# Patient Record
Sex: MALE | Race: WHITE | NOT HISPANIC OR LATINO | URBAN - METROPOLITAN AREA
[De-identification: names, ages, dates, MRNs, and addresses within clinical notes are randomized per-mention and may not be internally consistent; named-entity substitution may affect disease eponyms.]

---

## 2018-07-03 ENCOUNTER — INPATIENT (INPATIENT)
Facility: HOSPITAL | Age: 43
LOS: 1 days | Discharge: HOME | DRG: 305 | End: 2018-07-05
Attending: INTERNAL MEDICINE | Admitting: INTERNAL MEDICINE
Payer: MEDICAID

## 2018-07-03 VITALS
HEIGHT: 68 IN | TEMPERATURE: 98 F | OXYGEN SATURATION: 98 % | RESPIRATION RATE: 16 BRPM | WEIGHT: 165.35 LBS | SYSTOLIC BLOOD PRESSURE: 186 MMHG | DIASTOLIC BLOOD PRESSURE: 127 MMHG | HEART RATE: 70 BPM

## 2018-07-03 DIAGNOSIS — E78.5 HYPERLIPIDEMIA, UNSPECIFIED: ICD-10-CM

## 2018-07-03 DIAGNOSIS — Z29.9 ENCOUNTER FOR PROPHYLACTIC MEASURES, UNSPECIFIED: ICD-10-CM

## 2018-07-03 DIAGNOSIS — R42 DIZZINESS AND GIDDINESS: ICD-10-CM

## 2018-07-03 DIAGNOSIS — I16.1 HYPERTENSIVE EMERGENCY: ICD-10-CM

## 2018-07-03 LAB
ALBUMIN SERPL ELPH-MCNC: 4.3 G/DL — SIGNIFICANT CHANGE UP (ref 3.5–5)
ALP SERPL-CCNC: 62 U/L — SIGNIFICANT CHANGE UP (ref 40–120)
ALT FLD-CCNC: 39 U/L DA — SIGNIFICANT CHANGE UP (ref 10–60)
ANION GAP SERPL CALC-SCNC: 6 MMOL/L — SIGNIFICANT CHANGE UP (ref 5–17)
APPEARANCE UR: CLEAR — SIGNIFICANT CHANGE UP
APPEARANCE UR: CLEAR — SIGNIFICANT CHANGE UP
AST SERPL-CCNC: 32 U/L — SIGNIFICANT CHANGE UP (ref 10–40)
BACTERIA # UR AUTO: NEGATIVE /HPF — SIGNIFICANT CHANGE UP
BASOPHILS # BLD AUTO: 0.1 K/UL — SIGNIFICANT CHANGE UP (ref 0–0.2)
BASOPHILS NFR BLD AUTO: 1.1 % — SIGNIFICANT CHANGE UP (ref 0–2)
BILIRUB SERPL-MCNC: 0.8 MG/DL — SIGNIFICANT CHANGE UP (ref 0.2–1.2)
BILIRUB UR-MCNC: NEGATIVE — SIGNIFICANT CHANGE UP
BILIRUB UR-MCNC: NEGATIVE — SIGNIFICANT CHANGE UP
BUN SERPL-MCNC: 10 MG/DL — SIGNIFICANT CHANGE UP (ref 7–18)
CALCIUM SERPL-MCNC: 9.5 MG/DL — SIGNIFICANT CHANGE UP (ref 8.4–10.5)
CHLORIDE SERPL-SCNC: 103 MMOL/L — SIGNIFICANT CHANGE UP (ref 96–108)
CK MB BLD-MCNC: <0.7 % — SIGNIFICANT CHANGE UP (ref 0–3.5)
CK MB BLD-MCNC: <0.8 % — SIGNIFICANT CHANGE UP (ref 0–3.5)
CK MB CFR SERPL CALC: <1 NG/ML — SIGNIFICANT CHANGE UP (ref 0–3.6)
CK SERPL-CCNC: 121 U/L — SIGNIFICANT CHANGE UP (ref 35–232)
CK SERPL-CCNC: 141 U/L — SIGNIFICANT CHANGE UP (ref 35–232)
CO2 SERPL-SCNC: 28 MMOL/L — SIGNIFICANT CHANGE UP (ref 22–31)
COLOR SPEC: YELLOW — SIGNIFICANT CHANGE UP
COLOR SPEC: YELLOW — SIGNIFICANT CHANGE UP
CREAT SERPL-MCNC: 0.85 MG/DL — SIGNIFICANT CHANGE UP (ref 0.5–1.3)
DIFF PNL FLD: NEGATIVE — SIGNIFICANT CHANGE UP
DIFF PNL FLD: NEGATIVE — SIGNIFICANT CHANGE UP
EOSINOPHIL # BLD AUTO: 0.1 K/UL — SIGNIFICANT CHANGE UP (ref 0–0.5)
EOSINOPHIL NFR BLD AUTO: 1 % — SIGNIFICANT CHANGE UP (ref 0–6)
EPI CELLS # UR: NEGATIVE /HPF — SIGNIFICANT CHANGE UP
GLUCOSE SERPL-MCNC: 102 MG/DL — HIGH (ref 70–99)
GLUCOSE UR QL: NEGATIVE — SIGNIFICANT CHANGE UP
GLUCOSE UR QL: NEGATIVE — SIGNIFICANT CHANGE UP
HCT VFR BLD CALC: 44 % — SIGNIFICANT CHANGE UP (ref 39–50)
HGB BLD-MCNC: 14.6 G/DL — SIGNIFICANT CHANGE UP (ref 13–17)
KETONES UR-MCNC: NEGATIVE — SIGNIFICANT CHANGE UP
KETONES UR-MCNC: NEGATIVE — SIGNIFICANT CHANGE UP
LEUKOCYTE ESTERASE UR-ACNC: NEGATIVE — SIGNIFICANT CHANGE UP
LEUKOCYTE ESTERASE UR-ACNC: NEGATIVE — SIGNIFICANT CHANGE UP
LYMPHOCYTES # BLD AUTO: 1.9 K/UL — SIGNIFICANT CHANGE UP (ref 1–3.3)
LYMPHOCYTES # BLD AUTO: 25.9 % — SIGNIFICANT CHANGE UP (ref 13–44)
MAGNESIUM SERPL-MCNC: 2.4 MG/DL — SIGNIFICANT CHANGE UP (ref 1.6–2.6)
MCHC RBC-ENTMCNC: 32.1 PG — SIGNIFICANT CHANGE UP (ref 27–34)
MCHC RBC-ENTMCNC: 33.3 GM/DL — SIGNIFICANT CHANGE UP (ref 32–36)
MCV RBC AUTO: 96.3 FL — SIGNIFICANT CHANGE UP (ref 80–100)
MONOCYTES # BLD AUTO: 0.6 K/UL — SIGNIFICANT CHANGE UP (ref 0–0.9)
MONOCYTES NFR BLD AUTO: 8 % — SIGNIFICANT CHANGE UP (ref 2–14)
NEUTROPHILS # BLD AUTO: 4.7 K/UL — SIGNIFICANT CHANGE UP (ref 1.8–7.4)
NEUTROPHILS NFR BLD AUTO: 64.1 % — SIGNIFICANT CHANGE UP (ref 43–77)
NITRITE UR-MCNC: NEGATIVE — SIGNIFICANT CHANGE UP
NITRITE UR-MCNC: NEGATIVE — SIGNIFICANT CHANGE UP
PCP SPEC-MCNC: SIGNIFICANT CHANGE UP
PH UR: 7 — SIGNIFICANT CHANGE UP (ref 5–8)
PH UR: 7 — SIGNIFICANT CHANGE UP (ref 5–8)
PLATELET # BLD AUTO: 157 K/UL — SIGNIFICANT CHANGE UP (ref 150–400)
POTASSIUM SERPL-MCNC: 4.3 MMOL/L — SIGNIFICANT CHANGE UP (ref 3.5–5.3)
POTASSIUM SERPL-SCNC: 4.3 MMOL/L — SIGNIFICANT CHANGE UP (ref 3.5–5.3)
PROT SERPL-MCNC: 8.2 G/DL — SIGNIFICANT CHANGE UP (ref 6–8.3)
PROT UR-MCNC: NEGATIVE — SIGNIFICANT CHANGE UP
PROT UR-MCNC: NEGATIVE — SIGNIFICANT CHANGE UP
RBC # BLD: 4.57 M/UL — SIGNIFICANT CHANGE UP (ref 4.2–5.8)
RBC # FLD: 10.7 % — SIGNIFICANT CHANGE UP (ref 10.3–14.5)
RBC CASTS # UR COMP ASSIST: SIGNIFICANT CHANGE UP /HPF (ref 0–2)
SODIUM SERPL-SCNC: 137 MMOL/L — SIGNIFICANT CHANGE UP (ref 135–145)
SP GR SPEC: 1 — LOW (ref 1.01–1.02)
SP GR SPEC: 1.01 — SIGNIFICANT CHANGE UP (ref 1.01–1.02)
TROPONIN I SERPL-MCNC: 0.06 NG/ML — HIGH (ref 0–0.04)
TROPONIN I SERPL-MCNC: 0.07 NG/ML — HIGH (ref 0–0.04)
TROPONIN I SERPL-MCNC: 0.08 NG/ML — HIGH (ref 0–0.04)
UROBILINOGEN FLD QL: NEGATIVE — SIGNIFICANT CHANGE UP
UROBILINOGEN FLD QL: NEGATIVE — SIGNIFICANT CHANGE UP
WBC # BLD: 7.3 K/UL — SIGNIFICANT CHANGE UP (ref 3.8–10.5)
WBC # FLD AUTO: 7.3 K/UL — SIGNIFICANT CHANGE UP (ref 3.8–10.5)
WBC UR QL: SIGNIFICANT CHANGE UP /HPF (ref 0–5)

## 2018-07-03 PROCEDURE — 70450 CT HEAD/BRAIN W/O DYE: CPT | Mod: 26

## 2018-07-03 PROCEDURE — 99223 1ST HOSP IP/OBS HIGH 75: CPT | Mod: GC

## 2018-07-03 PROCEDURE — 99285 EMERGENCY DEPT VISIT HI MDM: CPT

## 2018-07-03 RX ORDER — MORPHINE SULFATE 50 MG/1
4 CAPSULE, EXTENDED RELEASE ORAL ONCE
Qty: 0 | Refills: 0 | Status: DISCONTINUED | OUTPATIENT
Start: 2018-07-03 | End: 2018-07-03

## 2018-07-03 RX ORDER — TICAGRELOR 90 MG/1
180 TABLET ORAL ONCE
Qty: 0 | Refills: 0 | Status: COMPLETED | OUTPATIENT
Start: 2018-07-03 | End: 2018-07-03

## 2018-07-03 RX ORDER — LABETALOL HCL 100 MG
10 TABLET ORAL ONCE
Qty: 0 | Refills: 0 | Status: COMPLETED | OUTPATIENT
Start: 2018-07-03 | End: 2018-07-03

## 2018-07-03 RX ORDER — ENOXAPARIN SODIUM 100 MG/ML
70 INJECTION SUBCUTANEOUS ONCE
Qty: 0 | Refills: 0 | Status: COMPLETED | OUTPATIENT
Start: 2018-07-03 | End: 2018-07-03

## 2018-07-03 RX ORDER — NIFEDIPINE 30 MG
90 TABLET, EXTENDED RELEASE 24 HR ORAL ONCE
Qty: 0 | Refills: 0 | Status: DISCONTINUED | OUTPATIENT
Start: 2018-07-03 | End: 2018-07-03

## 2018-07-03 RX ORDER — SODIUM CHLORIDE 9 MG/ML
2000 INJECTION INTRAMUSCULAR; INTRAVENOUS; SUBCUTANEOUS ONCE
Qty: 0 | Refills: 0 | Status: COMPLETED | OUTPATIENT
Start: 2018-07-03 | End: 2018-07-03

## 2018-07-03 RX ORDER — ATORVASTATIN CALCIUM 80 MG/1
80 TABLET, FILM COATED ORAL ONCE
Qty: 0 | Refills: 0 | Status: COMPLETED | OUTPATIENT
Start: 2018-07-03 | End: 2018-07-03

## 2018-07-03 RX ORDER — AMLODIPINE BESYLATE 2.5 MG/1
10 TABLET ORAL DAILY
Qty: 0 | Refills: 0 | Status: DISCONTINUED | OUTPATIENT
Start: 2018-07-03 | End: 2018-07-03

## 2018-07-03 RX ORDER — NICOTINE POLACRILEX 2 MG
1 GUM BUCCAL DAILY
Qty: 0 | Refills: 0 | Status: DISCONTINUED | OUTPATIENT
Start: 2018-07-03 | End: 2018-07-05

## 2018-07-03 RX ORDER — METOPROLOL TARTRATE 50 MG
12.5 TABLET ORAL
Qty: 0 | Refills: 0 | Status: DISCONTINUED | OUTPATIENT
Start: 2018-07-03 | End: 2018-07-05

## 2018-07-03 RX ORDER — ASPIRIN/CALCIUM CARB/MAGNESIUM 324 MG
81 TABLET ORAL DAILY
Qty: 0 | Refills: 0 | Status: DISCONTINUED | OUTPATIENT
Start: 2018-07-03 | End: 2018-07-05

## 2018-07-03 RX ORDER — OMEGA-3 ACID ETHYL ESTERS 1 G
1 CAPSULE ORAL
Qty: 0 | Refills: 0 | COMMUNITY

## 2018-07-03 RX ORDER — ATORVASTATIN CALCIUM 80 MG/1
40 TABLET, FILM COATED ORAL AT BEDTIME
Qty: 0 | Refills: 0 | Status: DISCONTINUED | OUTPATIENT
Start: 2018-07-04 | End: 2018-07-05

## 2018-07-03 RX ORDER — HYDRALAZINE HCL 50 MG
50 TABLET ORAL ONCE
Qty: 0 | Refills: 0 | Status: COMPLETED | OUTPATIENT
Start: 2018-07-03 | End: 2018-07-03

## 2018-07-03 RX ORDER — NIFEDIPINE 30 MG
60 TABLET, EXTENDED RELEASE 24 HR ORAL ONCE
Qty: 0 | Refills: 0 | Status: COMPLETED | OUTPATIENT
Start: 2018-07-03 | End: 2018-07-03

## 2018-07-03 RX ORDER — ASPIRIN/CALCIUM CARB/MAGNESIUM 324 MG
325 TABLET ORAL ONCE
Qty: 0 | Refills: 0 | Status: COMPLETED | OUTPATIENT
Start: 2018-07-03 | End: 2018-07-03

## 2018-07-03 RX ORDER — LISINOPRIL 2.5 MG/1
10 TABLET ORAL DAILY
Qty: 0 | Refills: 0 | Status: DISCONTINUED | OUTPATIENT
Start: 2018-07-03 | End: 2018-07-05

## 2018-07-03 RX ADMIN — ENOXAPARIN SODIUM 70 MILLIGRAM(S): 100 INJECTION SUBCUTANEOUS at 13:48

## 2018-07-03 RX ADMIN — Medication 325 MILLIGRAM(S): at 13:42

## 2018-07-03 RX ADMIN — Medication 1 PATCH: at 23:28

## 2018-07-03 RX ADMIN — MORPHINE SULFATE 4 MILLIGRAM(S): 50 CAPSULE, EXTENDED RELEASE ORAL at 14:48

## 2018-07-03 RX ADMIN — Medication 60 MILLIGRAM(S): at 11:16

## 2018-07-03 RX ADMIN — ATORVASTATIN CALCIUM 80 MILLIGRAM(S): 80 TABLET, FILM COATED ORAL at 13:44

## 2018-07-03 RX ADMIN — TICAGRELOR 180 MILLIGRAM(S): 90 TABLET ORAL at 13:43

## 2018-07-03 RX ADMIN — Medication 10 MILLIGRAM(S): at 14:24

## 2018-07-03 RX ADMIN — Medication 50 MILLIGRAM(S): at 15:37

## 2018-07-03 RX ADMIN — SODIUM CHLORIDE 2000 MILLILITER(S): 9 INJECTION INTRAMUSCULAR; INTRAVENOUS; SUBCUTANEOUS at 11:06

## 2018-07-03 RX ADMIN — MORPHINE SULFATE 4 MILLIGRAM(S): 50 CAPSULE, EXTENDED RELEASE ORAL at 16:28

## 2018-07-03 NOTE — H&P ADULT - FAMILY HISTORY
Father  Still living? Yes, Estimated age: Age Unknown  Family history of hypertension in father, Age at diagnosis: Age Unknown     Mother  Still living? Unknown  Family history of diabetes mellitus in mother, Age at diagnosis: Age Unknown

## 2018-07-03 NOTE — ED PROVIDER NOTE - OBJECTIVE STATEMENT
Pertinent PMH/PSH/FHx/SHx and Review of Systems contained within:  43M hx high cholesterol on fish on pw 2 weeks dizziness and left sided achiness. patient notes the symptom of dizziness is the sensation that he is spinning around. he denies the room spins and denies he is lightheaded. no cp, sob, nausea, vomiting, ha, vision change, trauma. patient has not falllen. he notes he had a similar episode 5 years ago and was told cholesterol was the cause at that time  he comes in after 2 weeks because he is tired of the symptoms and they would not resolve on their own, but they are no worse today than before  Fh and Sh not otherwise contributory  ROS otherwise negative

## 2018-07-03 NOTE — H&P ADULT - ATTENDING COMMENTS
Patient seen/evaluated at bedside 7/3/2018. I agree with the resident H&P note/outlined plan of care. My independent findings and conclusions are documented. Patient seen/evaluated at bedside 7/3/2018. I agree with the resident H&P note/outlined plan of care. My independent findings and conclusions are documented.    42 y/o m admitted with hypertension with systolic bp to 202, diastolic BP in the 120s. Denies chest pain, sob, visual changes, nausea/vomiting, focal weakness.  In addition to above: Patient denies chest pain  Patient with left upper quadrant abdominal pain--> worse with sitting upright. No nausea/vomiting, dysuria, hematuria. + chronic neck pain, left arm pain x 5 years    Of note, patient was given Brillinta and Lovenox by ED staff. Troponin 0.058    AAOx3 NAD  neck supple  S1S2 RRR  CTAb/l no accessory muscle use  soft, NT, ND, + BS, no guarding/rebound  no flank tenderness  mild left upper quadrant pain  no LE edema/cyanosis/clubbing  CN II-XII intact  strength 5/5 upper/lower extremities  sensation intact  reflexes 2+ patellar      1. hypertensive emergency  2. elevated troponin/demand ischemia  3. lightheadedness  4. left arm pain  5. left upper quadrant pain  6. nasopharyngeal fullness  7. continuous tobacco dependence    no signs of cardiac/neurologic decompensation at this time. EKG w/ TWI likely secondary to   goal SBP 170s at this time. Urine toxicology positive for morphine administered in ED (no cocaine/ amphetamines/PCP)  -cycle cardiac biomarkers  -can start oral regimen, lisinopril  -continue to trend troponin, defer  therapeutitic AC/antiplatelet outside of ASA at this time  will have second radiology review of CT head imaging: ? "nasopharyngeal fullness"--> may need appt arranged with ENT if asymptomatic  -TTE  -monitor on telemetry  -lipid panel, HgbA1c, Patient seen/evaluated at bedside 7/3/2018. I agree with the resident H&P note/outlined plan of care. My independent findings and conclusions are documented.    44 y/o m admitted with hypertension with systolic bp to 202, diastolic BP in the 120s. Denies chest pain, sob, visual changes, nausea/vomiting, focal weakness.  In addition to above: Patient denies chest pain  Patient with left upper quadrant abdominal pain--> worse with sitting upright. No nausea/vomiting, dysuria, hematuria. + chronic neck pain, left arm pain x 5 years    Of note, patient was given Brillinta and Lovenox by ED staff. Troponin 0.058    AAOx3 NAD  neck supple  S1S2 RRR  CTAb/l no accessory muscle use  soft, NT, ND, + BS, no guarding/rebound  no flank tenderness  mild left upper quadrant pain  no LE edema/cyanosis/clubbing  CN II-XII intact  strength 5/5 upper/lower extremities  sensation intact  reflexes 2+ patellar      1. hypertensive emergency  2. elevated troponin/demand ischemia  3. lightheadedness  4. left arm pain  5. left upper quadrant pain  6. nasopharyngeal fullness  7. continuous tobacco dependence    no signs of cardiac/neurologic decompensation at this time. EKG w/ TWI likely secondary to   goal SBP 170s at this time. Urine toxicology positive for morphine administered in ED (no cocaine/ amphetamines/PCP)  -cycle cardiac biomarkers  -can start oral regimen, lisinopril  -continue to trend troponin, defer  therapeutitic AC/antiplatelet outside of ASA at this time  will have second radiology review of CT head imaging: ? "nasopharyngeal fullness"--> may need appt arranged with ENT if asymptomatic  -TTE  -monitor on telemetry  -lipid panel, HgbA1c,  -discuss w/ cardiology  -check CT abd/pelvis  -check CT cervical neck  -consider trial of neurontin--> potential radicular symptoms, will re-assess  -tobacco use cessation

## 2018-07-03 NOTE — H&P ADULT - NEGATIVE CARDIOVASCULAR SYMPTOMS
no claudication/no peripheral edema/no palpitations/no dyspnea on exertion/no orthopnea/no chest pain

## 2018-07-03 NOTE — H&P ADULT - NEUROLOGICAL DETAILS
sensation intact/alert and oriented x 3/responds to verbal commands/responds to pain/normal strength

## 2018-07-03 NOTE — ED PROVIDER NOTE - PHYSICAL EXAMINATION
Gen: Alert, NAD  Head: NC, AT   Eyes: PERRL, EOMI, normal lids/conjunctiva  ENT: normal hearing, patent oropharynx without erythema/exudate, uvula midline  Neck: supple, no tenderness, Trachea midline  Pulm: Bilateral BS, normal resp effort, no wheeze/stridor/retractions  CV: RRR, no M/R/G, 2+ radial and dp pulses bl, no edema  Abd: soft, NT/ND, +BS, no hepatosplenomegaly  Mskel: extremities x4 with normal ROM and no joint effusions. no ctl spine ttp.   Skin: no rash, no bruising   Neuro: AAOx3, no sensory/motor deficits, CN 2-12 intact, gait stable

## 2018-07-03 NOTE — H&P ADULT - HISTORY OF PRESENT ILLNESS
43M PMHx HLD, who presented to ED c/o lightheadedness and frontal headache x1 week, along with left arm pain and LUQ pain that started last night. Patient refers having blood pressure checked by his sister, which revealed SBP: 185. Symptoms were not resolving and patient decided to come to ED. As per patient, this is the first time he has high blood pressure. Denies N/V, visual changes, chest pain, palpitations, shortness of breath, paresthesias or previous similar episodes. Patient is a current tobacco smoker, 1.5 PPD for >20yrs, current marijuana user and social alcohol drinker. Last PCP follow up visit was 2-3 yrs ago. Denies recreational drug use.    ED course:  BP: 202/103 mmHg, HR: 58 bpm, RR: 16 rpm, SaO2: 98% RA, T: 98F; s/p Labetalol 10mg IVP  ECG: NSR VR @69bpm, no ST changes, TWI lead III  T1: 0.058  s/p full dose Lovenox,  mg, Lipitor 80 mg HS, Brilinta 180 mg prophylactically given by ED physician

## 2018-07-03 NOTE — H&P ADULT - RS GEN PE MLT RESP DETAILS PC
clear to auscultation bilaterally/no chest wall tenderness/good air movement/no wheezes/no rales/no rhonchi

## 2018-07-03 NOTE — ED PROVIDER NOTE - MEDICAL DECISION MAKING DETAILS
patient pw dizziness. suspect dehydration vs symptomatic htn. will give fluids and also start on an anti-htn. will check labs and ct scan. this would be an atypical presenation for acs and given symptoms have not changed in 2 weeks, will order a solitary trop. I read ekg as nsr rate 69 with LVH; no st elevation or depression, no pr or qtc prolongation, normal axis. patient pw dizziness. suspect dehydration vs symptomatic htn. will give fluids and also start on an anti-htn. will check labs and ct scan. this would be an atypical presentation for acs and given symptoms have not changed in 2 weeks, will order a solitary trop. I read ekg as nsr rate 69 with LVH; no st elevation or depression, no pr or qtc prolongation, normal axis.

## 2018-07-03 NOTE — H&P ADULT - PROBLEM SELECTOR PLAN 3
IMPROVE VTE Individual Risk Assessment          RISK                                                          Points  [  ] Previous VTE                                                3  [  ] Thrombophilia                                             2  [  ] Lower limb paralysis                                   2        (unable to hold up >15 seconds)    [  ] Current Cancer                                             2         (within 6 months)  [  ] Immobilization > 24 hrs                              1  [  ] ICU/CCU stay > 24 hours                             1  [  ] Age > 60                                                         1    IMPROVE VTE Score: 0  no need for DVT chemoppx

## 2018-07-03 NOTE — H&P ADULT - PROBLEM SELECTOR PLAN 1
Patient p/w lightheadedness and frontal headache x1 wk. BP: 203/103 mmHg  ECG: NSR VR@69 bpm, LVH TWI lead III, T1: 0.058, most likely due to demand  Will trend CE, f/up T2@5pm, T3@11pm  c/w ASA 81 mg + Lipitor 40 mg HS  Will hold off BB until UTox results  Goal  during first 24 hrs  c/w Norvasc 10 mg QD  Lopressor 2.5 mg IV q6hrs PRN SBP>175  Telemonitoring  Neurochecks q4hrs  CT head negative for acute events  f/up TTE Patient p/w lightheadedness and frontal headache x1 wk. BP: 203/103 mmHg  ECG: NSR VR@69 bpm, LVH TWI lead III, T1: 0.058, most likely due to demand  Will trend CE, f/up T2@5pm, T3@11pm  c/w ASA 81 mg + Lipitor 40 mg HS  Will hold off BB until UTox results  Goal  during first 24 hrs  c/w Norvasc 10 mg QD  Lopressor 2.5 mg IV q6hrs PRN SBP>175  Telemonitoring  CT head negative for acute events; no focal neurological findings on exam  Neurochecks q4hrs  f/up TTE Patient p/w lightheadedness and frontal headache x1 wk. BP: 203/103 mmHg  ECG: NSR VR@69 bpm, LVH TWI lead III, T1: 0.058, most likely due to demand  Will trend CE, f/up T2@5pm, T3@11pm  c/w ASA 81 mg + Lipitor 40 mg HS  Will hold off BB until UTox results  Goal  during first 24 hrs  c/w Lisinopril 10 mg QD for now, monitor BP and adjust as needed  Lopressor 2.5 mg IV q6hrs PRN SBP>175  Telemonitoring  CT head negative for acute events; no focal neurological findings on exam  Neurochecks q4hrs  f/up TTE

## 2018-07-03 NOTE — H&P ADULT - PROBLEM SELECTOR PLAN 2
Patient having history of high cholesterol, not on medications as home. Refers only taking fish oil supplements.  c/w Lipitor 40 mg HS  f/up Lipid panel

## 2018-07-03 NOTE — H&P ADULT - NEGATIVE NEUROLOGICAL SYMPTOMS
no tremors/no paresthesias/no confusion/no loss of consciousness/no syncope/no vertigo/no loss of sensation/no difficulty walking/no weakness

## 2018-07-03 NOTE — H&P ADULT - ASSESSMENT
43M PMHx HLD, who presented to ED c/o lightheadedness and frontal headache x1 week, along with left arm pain and LUQ pain that started last night.

## 2018-07-04 DIAGNOSIS — F17.200 NICOTINE DEPENDENCE, UNSPECIFIED, UNCOMPLICATED: ICD-10-CM

## 2018-07-04 DIAGNOSIS — M79.602 PAIN IN LEFT ARM: ICD-10-CM

## 2018-07-04 LAB
24R-OH-CALCIDIOL SERPL-MCNC: 22.9 NG/ML — LOW (ref 30–80)
ALBUMIN SERPL ELPH-MCNC: 3.8 G/DL — SIGNIFICANT CHANGE UP (ref 3.5–5)
ALP SERPL-CCNC: 55 U/L — SIGNIFICANT CHANGE UP (ref 40–120)
ALT FLD-CCNC: 36 U/L DA — SIGNIFICANT CHANGE UP (ref 10–60)
ANION GAP SERPL CALC-SCNC: 10 MMOL/L — SIGNIFICANT CHANGE UP (ref 5–17)
AST SERPL-CCNC: 23 U/L — SIGNIFICANT CHANGE UP (ref 10–40)
BASOPHILS # BLD AUTO: 0.1 K/UL — SIGNIFICANT CHANGE UP (ref 0–0.2)
BASOPHILS NFR BLD AUTO: 1.1 % — SIGNIFICANT CHANGE UP (ref 0–2)
BILIRUB SERPL-MCNC: 0.8 MG/DL — SIGNIFICANT CHANGE UP (ref 0.2–1.2)
BUN SERPL-MCNC: 8 MG/DL — SIGNIFICANT CHANGE UP (ref 7–18)
CALCIUM SERPL-MCNC: 9.2 MG/DL — SIGNIFICANT CHANGE UP (ref 8.4–10.5)
CHLORIDE SERPL-SCNC: 102 MMOL/L — SIGNIFICANT CHANGE UP (ref 96–108)
CHOLEST SERPL-MCNC: 166 MG/DL — SIGNIFICANT CHANGE UP (ref 10–199)
CK MB BLD-MCNC: <1 % — SIGNIFICANT CHANGE UP (ref 0–3.5)
CK MB CFR SERPL CALC: <1 NG/ML — SIGNIFICANT CHANGE UP (ref 0–3.6)
CK SERPL-CCNC: 97 U/L — SIGNIFICANT CHANGE UP (ref 35–232)
CO2 SERPL-SCNC: 25 MMOL/L — SIGNIFICANT CHANGE UP (ref 22–31)
CREAT SERPL-MCNC: 0.78 MG/DL — SIGNIFICANT CHANGE UP (ref 0.5–1.3)
D DIMER BLD IA.RAPID-MCNC: <150 NG/ML DDU — SIGNIFICANT CHANGE UP
EOSINOPHIL # BLD AUTO: 0.1 K/UL — SIGNIFICANT CHANGE UP (ref 0–0.5)
EOSINOPHIL NFR BLD AUTO: 1.2 % — SIGNIFICANT CHANGE UP (ref 0–6)
GLUCOSE SERPL-MCNC: 97 MG/DL — SIGNIFICANT CHANGE UP (ref 70–99)
HBA1C BLD-MCNC: 4.8 % — SIGNIFICANT CHANGE UP (ref 4–5.6)
HCT VFR BLD CALC: 43.6 % — SIGNIFICANT CHANGE UP (ref 39–50)
HDLC SERPL-MCNC: 42 MG/DL — SIGNIFICANT CHANGE UP (ref 40–125)
HGB BLD-MCNC: 15 G/DL — SIGNIFICANT CHANGE UP (ref 13–17)
LIPID PNL WITH DIRECT LDL SERPL: 96 MG/DL — SIGNIFICANT CHANGE UP
LYMPHOCYTES # BLD AUTO: 2.5 K/UL — SIGNIFICANT CHANGE UP (ref 1–3.3)
LYMPHOCYTES # BLD AUTO: 32.6 % — SIGNIFICANT CHANGE UP (ref 13–44)
MAGNESIUM SERPL-MCNC: 2.2 MG/DL — SIGNIFICANT CHANGE UP (ref 1.6–2.6)
MCHC RBC-ENTMCNC: 33.1 PG — SIGNIFICANT CHANGE UP (ref 27–34)
MCHC RBC-ENTMCNC: 34.3 GM/DL — SIGNIFICANT CHANGE UP (ref 32–36)
MCV RBC AUTO: 96.4 FL — SIGNIFICANT CHANGE UP (ref 80–100)
MONOCYTES # BLD AUTO: 0.7 K/UL — SIGNIFICANT CHANGE UP (ref 0–0.9)
MONOCYTES NFR BLD AUTO: 9.7 % — SIGNIFICANT CHANGE UP (ref 2–14)
NEUTROPHILS # BLD AUTO: 4.2 K/UL — SIGNIFICANT CHANGE UP (ref 1.8–7.4)
NEUTROPHILS NFR BLD AUTO: 55.4 % — SIGNIFICANT CHANGE UP (ref 43–77)
PHOSPHATE SERPL-MCNC: 4.2 MG/DL — SIGNIFICANT CHANGE UP (ref 2.5–4.5)
PLATELET # BLD AUTO: 145 K/UL — LOW (ref 150–400)
POTASSIUM SERPL-MCNC: 3.6 MMOL/L — SIGNIFICANT CHANGE UP (ref 3.5–5.3)
POTASSIUM SERPL-SCNC: 3.6 MMOL/L — SIGNIFICANT CHANGE UP (ref 3.5–5.3)
PROT SERPL-MCNC: 7.7 G/DL — SIGNIFICANT CHANGE UP (ref 6–8.3)
RBC # BLD: 4.52 M/UL — SIGNIFICANT CHANGE UP (ref 4.2–5.8)
RBC # FLD: 10.6 % — SIGNIFICANT CHANGE UP (ref 10.3–14.5)
SODIUM SERPL-SCNC: 137 MMOL/L — SIGNIFICANT CHANGE UP (ref 135–145)
TOTAL CHOLESTEROL/HDL RATIO MEASUREMENT: 4 RATIO — SIGNIFICANT CHANGE UP (ref 3.4–9.6)
TRIGL SERPL-MCNC: 140 MG/DL — SIGNIFICANT CHANGE UP (ref 10–149)
TROPONIN I SERPL-MCNC: 0.07 NG/ML — HIGH (ref 0–0.04)
TSH SERPL-MCNC: 1.2 UU/ML — SIGNIFICANT CHANGE UP (ref 0.34–4.82)
VIT B12 SERPL-MCNC: 549 PG/ML — SIGNIFICANT CHANGE UP (ref 232–1245)
WBC # BLD: 7.6 K/UL — SIGNIFICANT CHANGE UP (ref 3.8–10.5)
WBC # FLD AUTO: 7.6 K/UL — SIGNIFICANT CHANGE UP (ref 3.8–10.5)

## 2018-07-04 PROCEDURE — 74174 CTA ABD&PLVS W/CONTRAST: CPT | Mod: 26

## 2018-07-04 PROCEDURE — 93306 TTE W/DOPPLER COMPLETE: CPT | Mod: 26

## 2018-07-04 PROCEDURE — 99233 SBSQ HOSP IP/OBS HIGH 50: CPT | Mod: GC

## 2018-07-04 PROCEDURE — 93010 ELECTROCARDIOGRAM REPORT: CPT

## 2018-07-04 PROCEDURE — 72125 CT NECK SPINE W/O DYE: CPT | Mod: 26

## 2018-07-04 RX ORDER — ACETAMINOPHEN 500 MG
650 TABLET ORAL EVERY 6 HOURS
Qty: 0 | Refills: 0 | Status: DISCONTINUED | OUTPATIENT
Start: 2018-07-04 | End: 2018-07-05

## 2018-07-04 RX ORDER — SODIUM CHLORIDE 9 MG/ML
1000 INJECTION INTRAMUSCULAR; INTRAVENOUS; SUBCUTANEOUS
Qty: 0 | Refills: 0 | Status: DISCONTINUED | OUTPATIENT
Start: 2018-07-04 | End: 2018-07-05

## 2018-07-04 RX ADMIN — Medication 1 PATCH: at 12:19

## 2018-07-04 RX ADMIN — Medication 650 MILLIGRAM(S): at 08:44

## 2018-07-04 RX ADMIN — Medication 12.5 MILLIGRAM(S): at 05:26

## 2018-07-04 RX ADMIN — LISINOPRIL 10 MILLIGRAM(S): 2.5 TABLET ORAL at 05:26

## 2018-07-04 RX ADMIN — Medication 650 MILLIGRAM(S): at 09:45

## 2018-07-04 RX ADMIN — ATORVASTATIN CALCIUM 40 MILLIGRAM(S): 80 TABLET, FILM COATED ORAL at 22:26

## 2018-07-04 RX ADMIN — Medication 81 MILLIGRAM(S): at 12:19

## 2018-07-04 RX ADMIN — Medication 650 MILLIGRAM(S): at 17:20

## 2018-07-04 RX ADMIN — Medication 650 MILLIGRAM(S): at 02:41

## 2018-07-04 RX ADMIN — Medication 12.5 MILLIGRAM(S): at 17:20

## 2018-07-04 RX ADMIN — Medication 1 PATCH: at 23:22

## 2018-07-04 RX ADMIN — SODIUM CHLORIDE 75 MILLILITER(S): 9 INJECTION INTRAMUSCULAR; INTRAVENOUS; SUBCUTANEOUS at 14:09

## 2018-07-04 RX ADMIN — Medication 650 MILLIGRAM(S): at 02:01

## 2018-07-04 NOTE — PROGRESS NOTE ADULT - PROBLEM SELECTOR PLAN 1
CTA abdomen and pelvis with IV contrast was insignificant  ECHO showed no abnormality.  C/w with antihypertensive medications  monitor BP  Stress testing

## 2018-07-04 NOTE — PROGRESS NOTE ADULT - ATTENDING COMMENTS
Patient seen/evaluated at bedside 7/4/2018. I agree with the resident progress note/outlined plan of care. My independent findings and conclusions are documented.    c/o intermittent left shoulder pain x 5 years  still w/ left upper quadrant abdominal pain    AOx3 NAD  neck supple  S1S2 RRR  CTAb/l no accessory muscle use  soft, NT, ND, + BS, no guarding/rebound  no flank tenderness  mild left upper quadrant pain  no LE edema/cyanosis/clubbing  CN II-XII intact  strength 5/5 upper/lower extremities  sensation intact  reflexes 2+ patellar      1. hypertensive emergency  2. elevated troponin/demand ischemia  3. lightheadedness  4. left arm pain  5. left upper quadrant pain  6. nasopharyngeal fullness  7. continuous tobacco dependence    no signs of cardiac/neurologic decompensation at this time. EKG w/ TWI likely secondary to   goal SBP 170s at this time. Urine toxicology positive for morphine administered in ED (no cocaine/ amphetamines/PCP)  -cycle cardiac biomarkers  -BP suboptimally controlled, increase lisinopril to 20mg po daily  -continue to trend troponin, defer  therapeutitic AC/antiplatelet outside of ASA at this time  will have second radiology review of CT head imaging: ? "nasopharyngeal fullness"--> may need appt arranged with ENT if asymptomatic  -TTE  -monitor on telemetry  -appreciate cardiology input--> plan for stress testing tomorrow  -check CT abd/pelvis is still pending  -check CT cervical neck is still pending  -left shoulder xray  -consider trial of neurontin--> potential radicular symptoms, will re-assess  -tobacco use cessation, add nicotine patch

## 2018-07-04 NOTE — PROGRESS NOTE ADULT - PROBLEM SELECTOR PLAN 2
CT cervical spine shows moderate spinal stenosis at C5-C6 and C6-c7  F/U with orthopedics for consultation

## 2018-07-04 NOTE — CONSULT NOTE ADULT - ATTENDING COMMENTS
Thank you for the courtesy of the consultation,I would be available for any further discussion if needed.  Geoff Person MD,FACC.  [address] [place], [place] [id number] [phone]

## 2018-07-04 NOTE — CONSULT NOTE ADULT - ASSESSMENT
· Assessment		  43M PMHx HLD, who presented to ED c/o lightheadedness and frontal headache x1 week, along with left arm pain and LUQ pain that started last night.        Problem/Plan - 1:  ·  Problem: Hypertensive emergency.  Plan: Patient p/w lightheadedness and frontal headache x1 wk. BP: 203/103 mmHg.  BP better,continue Lisinopril,metoprolol  Abnormal ECG-ischemic work up-NST in AM.        Problem/Plan - 2:  ·  Problem: HLD (hyperlipidemia).  Plan: Patient having history of high cholesterol, not on medications as home. Refers only taking fish oil supplements.  c/w Lipitor 40 mg HS

## 2018-07-04 NOTE — CHART NOTE - NSCHARTNOTEFT_GEN_A_CORE
Troponins trending up. Repeated EKG. It showed new T wave inversion in V4, V5, V6. Patient seen and examined at bedside with PGY-3 on call. Patient denies any chest pain, only complains of headache. Continue aspirin, statin, beta blocker and telemonitoring. Follow up echo and cardiology evaluation. Troponins ordered for the morning. Will signout to the day team.

## 2018-07-04 NOTE — PROGRESS NOTE ADULT - SUBJECTIVE AND OBJECTIVE BOX
PGY 1 Note discussed with supervising resident and primary attending    Patient is a 43y old  Male who presents with a chief complaint of lightheadedness (2018 16:08)      INTERVAL HPI/OVERNIGHT EVENTS: pain in left arm , increases with movement ,neck pain and upper abdominal pain     MEDICATIONS  (STANDING):  aspirin  chewable 81 milliGRAM(s) Oral daily  atorvastatin 40 milliGRAM(s) Oral at bedtime  lisinopril 10 milliGRAM(s) Oral daily  metoprolol tartrate 12.5 milliGRAM(s) Oral two times a day  nicotine - 21 mG/24Hr(s) Patch 1 patch Transdermal daily  sodium chloride 0.9%. 1000 milliLiter(s) (75 mL/Hr) IV Continuous <Continuous>    MEDICATIONS  (PRN):  acetaminophen   Tablet. 650 milliGRAM(s) Oral every 6 hours PRN Mild Pain (1 - 3)      __________________________________________________  REVIEW OF SYSTEMS:    CONSTITUTIONAL: No fever,   EYES: no acute visual disturbances  NECK: pain  RESPIRATORY: No cough; No shortness of breath  CARDIOVASCULAR: No chest pain, no palpitations  GASTROINTESTINAL: upper abdomen pain   NEUROLOGICAL: No headache or numbness, no tremors  MUSCULOSKELETAL: No joint pain, no muscle pain  GENITOURINARY: no dysuria, no frequency, no hesitancy  PSYCHIATRY: no depression , no anxiety  ALL OTHER  ROS negative        Vital Signs Last 24 Hrs  T(C): 37.1 (2018 13:21), Max: 37.1 (2018 13:21)  T(F): 98.8 (2018 13:21), Max: 98.8 (2018 13:21)  HR: 76 (2018 13:21) (67 - 76)  BP: 137/96 (2018 13:21) (121/74 - 153/80)  BP(mean): --  RR: 16 (2018 13:21) (16 - 18)  SpO2: 98% (2018 13:21) (97% - 98%)    ________________________________________________  PHYSICAL EXAM:  GENERAL: NAD  HEENT: Normocephalic;  conjunctivae and sclerae clear; moist mucous membranes;   NECK : supple  CHEST/LUNG: Clear to auscultation bilaterally with good air entry   HEART: S1 S2  regular; no murmurs, gallops or rubs  ABDOMEN: Soft, Nontender, Nondistended; Bowel sounds present  EXTREMITIES: no cyanosis; no edema; no calf tenderness  SKIN: warm and dry; no rash  NERVOUS SYSTEM:  Awake and alert; Oriented  to place, person and time ; no new deficits    _________________________________________________  LABS:                        15.0   7.6   )-----------( 145      ( 2018 07:25 )             43.6     07-04    137  |  102  |  8   ----------------------------<  97  3.6   |  25  |  0.78    Ca    9.2      2018 07:25  Phos  4.2     07-04  Mg     2.2     07-04    TPro  7.7  /  Alb  3.8  /  TBili  0.8  /  DBili  x   /  AST  23  /  ALT  36  /  AlkPhos  55  07-04  CARDIAC MARKERS ( 2018 07:25 )  0.068 ng/mL / x     / 97 U/L / x     / <1.0 ng/mL  CARDIAC MARKERS ( 2018 22:14 )  0.082 ng/mL / x     / 121 U/L / x     / <1.0 ng/mL  CARDIAC MARKERS ( 2018 18:53 )  0.067 ng/mL / x     / 141 U/L / x     / <1.0 ng/mL  CARDIAC MARKERS ( 2018 11:21 )  0.058 ng/mL / x     / x     / x     / <1.0 ng/mL    Urinalysis Basic - ( 2018 17:03 )    Color: Yellow / Appearance: Clear / S.010 / pH: x  Gluc: x / Ketone: Negative  / Bili: Negative / Urobili: Negative   Blood: x / Protein: Negative / Nitrite: Negative   Leuk Esterase: Negative / RBC: 0-2 /HPF / WBC 0-2 /HPF   Sq Epi: x / Non Sq Epi: Negative /HPF / Bacteria: Negative /HPF    urine toxiclogy : opiate +    RADIOLOGY & ADDITIONAL TESTS:    < from: CT Cervical Spine No Cont (18 @ 13:04) >  IMPRESSION:  No evidence for acute fracture. Intervertebral disc space   narrowing is identifiedat C5-C6 and C6-C7, with associated degenerative   osteophyte formation and resulting moderate spinal canal stenosis at   those levels. Consider MRI for further assessment if the patient is able   to undergo that examination.      < from: CT Angio Abdomen and Pelvis w/ IV Cont (18 @ 13:16) >    MPRESSION:  There is no evidence for active extravasation of   intravascular contrast at the time of CT evaluation. Patency of the   origins of the celiac, superior mesenteric and inferior mesenteric   arteries identified, without evidence for origin stenosis. No evidence   for mechanical bowel obstruction. No colonic wall thickening. No free   intraperitoneal air or fluid identified.    < from: Transthoracic Echocardiogram (18 @ 15:34) >  CONCLUSIONS:  1. Normal mitral valve. Trace mitral regurgitation.  2. Probably trileaflet aortic valve is not well seen. No  aortic stenosis. No aortic valve regurgitation seen.  3. Normal aortic root.      Care Discussed with Consultants :     Plan of care was discussed with patient and /or primary care giver; all questions and concerns were addressed and care was aligned with patient's wishes.

## 2018-07-04 NOTE — CONSULT NOTE ADULT - SUBJECTIVE AND OBJECTIVE BOX
CHIEF COMPLAINT:    HPI:-43M PMHx HLD, who presented to ED c/o lightheadedness and frontal headache x1 week, along with left arm pain and LUQ pain that started last night. Patient refers having blood pressure checked by his sister, which revealed SBP: 185. Symptoms were not resolving and patient decided to come to ED. As per patient, this is the first time he has high blood pressure. Denies N/V, visual changes, chest pain, palpitations, shortness of breath, paresthesias or previous similar episodes. Patient is a current tobacco smoker, 1.5 PPD for >20yrs, current marijuana user and social alcohol drinker. Last PCP follow up visit was 2-3 yrs ago.   ED course:  BP: 202/103 mmHg, HR: 58 bpm, RR: 16 rpm, SaO2: 98% RA, T: 98F; s/p Labetalol 10mg IVP  ECG: NSR VR @69bpm, no ST changes, TWI lead III  T1: 0.058  s/p full dose Lovenox,  mg, Lipitor 80 mg HS, Brilinta 180 mg prophylactically given by ED physician   Patient feels better no chest pain still has L flank discomfort,BP better.      PAST MEDICAL & SURGICAL HISTORY:  HLD (hyperlipidemia)  No significant past surgical history      MEDICATIONS  (STANDING):  aspirin  chewable 81 milliGRAM(s) Oral daily  atorvastatin 40 milliGRAM(s) Oral at bedtime  lisinopril 10 milliGRAM(s) Oral daily  metoprolol tartrate 12.5 milliGRAM(s) Oral two times a day  nicotine - 21 mG/24Hr(s) Patch 1 patch Transdermal daily    MEDICATIONS  (PRN):  acetaminophen   Tablet. 650 milliGRAM(s) Oral every 6 hours PRN Mild Pain (1 - 3)      FAMILY HISTORY:  Family history of diabetes mellitus in mother (Mother)  Family history of hypertension in father (Father)  No family history of premature coronary artery disease or sudden cardiac death    SOCIAL HISTORY:  Smoking-Current smoker  Alcohol-Social  Ilicit Drug use-Marijuana    REVIEW OF SYSTEMS:  Constitutional: [ ] fever, [ ]weight loss, [x ]fatigue Activity [ ] Bedbound,[ ] Ambulates [ ] Unassisted[ ] Cane/Walker [ ] Assistence.  Eyes: [ ] visual changes  Respiratory: [ ]shortness of breath;  [ ] cough, [ ]wheezing, [ ]chills, [ ]hemoptysis  Cardiovascular: [ ] chest pain, [ ]palpitations, [ ]dizziness,  [ ]leg swelling[ ]orthopnea [ ]PND  Gastrointestinal: [x ] abdominal pain, [ ]nausea, [ ]vomiting,  [ ]diarrhea,[ ]constipation  Genitourinary: [ ] dysuria, [ ] hematuria  Neurologic: [ ] headaches [ ] tremors[ ] weakness  Skin: [ ] itching, [ ]burning, [ ] rashes  Endocrine: [ ] heat or cold intolerance  Musculoskeletal: [ ] joint pain or swelling; [ ] muscle, back, or extremity pain  Psychiatric: [ ] depression, [ ]anxiety, [ ]mood swings, or [ ]difficulty sleeping  Hematologic: [ ] easy bruising, [ ] bleeding gums       [ x] All others negative	  [ ] Unable to obtain    Vital Signs Last 24 Hrs  T(C): 36.9 (04 Jul 2018 11:17), Max: 36.9 (04 Jul 2018 11:17)  T(F): 98.4 (04 Jul 2018 11:17), Max: 98.4 (04 Jul 2018 11:17)  HR: 67 (04 Jul 2018 11:17) (58 - 76)  BP: 121/74 (04 Jul 2018 11:17) (121/74 - 202/103)  RR: 16 (04 Jul 2018 11:17) (16 - 18)  SpO2: 97% (04 Jul 2018 11:17) (97% - 98%)  I&O's Summary      PHYSICAL EXAM:  General: No acute distress BMI-25  HEENT: EOMI, PERRL[ ] Icteric  Neck: Supple, No JVD  Lungs: Equal air entry bilaterally; [ ] Rales [ ] Rhonchi [ ] Wheezing  Heart: Regular rate and rhythm;[x ] Murmurs-  2 /6 [x ] Systolic [ ] Diastolic [ ] Radiation,No rubs, or gallops  Abdomen: Nontender, bowel sounds present  Extremities: No clubbing, cyanosis, or edema[ ] Calf tenderness  Nervous system:  Alert & Oriented X3, no focal deficits  Psychiatric: Normal affect  Skin: No rashes or lesions      LABS:  07-04    137  |  102  |  8   ----------------------------<  97  3.6   |  25  |  0.78    Ca    9.2      04 Jul 2018 07:25  Phos  4.2     07-04  Mg     2.2     07-04    TPro  7.7  /  Alb  3.8  /  TBili  0.8  /  DBili  x   /  AST  23  /  ALT  36  /  AlkPhos  55  07-04    Creatinine Trend: 0.78<--, 0.85<--                        15.0   7.6   )-----------( 145      ( 04 Jul 2018 07:25 )             43.6         Lipid Panel: Cholesterol, Serum 166  Direct LDL 96  HDL Cholesterol, Serum 42  Triglycerides, Serum 140    Cardiac Enzymes: CARDIAC MARKERS ( 04 Jul 2018 07:25 )  0.068 ng/mL / x     / 97 U/L / x     / <1.0 ng/mL  CARDIAC MARKERS ( 03 Jul 2018 22:14 )  0.082 ng/mL / x     / 121 U/L / x     / <1.0 ng/mL  CARDIAC MARKERS ( 03 Jul 2018 18:53 )  0.067 ng/mL / x     / 141 U/L / x     / <1.0 ng/mL  CARDIAC MARKERS ( 03 Jul 2018 11:21 )  0.058 ng/mL / x     / x     / x     / <1.0 ng/mL        07-04 [id number] 4.8    ECG [my interpretation]:Sinus rhythm anterior T wave abnormalities    TELEMETRY:Sinus rhythm no arrhythmias    ECHO:Prelim-Normal LV Systolic function,no segmental wall abnormalities.

## 2018-07-04 NOTE — PROGRESS NOTE ADULT - ASSESSMENT
43M PMHx HLD, who presented to ED c/o lightheadedness and frontal headache x1 week, along with left arm pain and LUQ pain that started last night. Patient refers having blood pressure checked by his sister, which revealed SBP: 185. Symptoms were not resolving and patient decided to come to ED. As per patient, this is the first time he has high blood pressure  ED course:  BP: 202/103 mmHg, HR: 58 bpm, RR: 16 rpm, SaO2: 98% RA, T: 98F; s/p Labetalol 10mg IVP  ECG: NSR VR @69bpm, no ST changes, TWI lead III  T1: 0.058  s/p full dose Lovenox,  mg, Lipitor 80 mg HS, Brilinta 180 mg prophylactically given by ED physician  patient admiited to floor. Trop I were not elevating. cardiology recommended stress testing. CTA abdomen and pelvis with IV contrast was insignificant . ECHO showed no abnormality. CT cervical spine shows moderate spinal stenosis at C5-C6 and C6-c7

## 2018-07-05 ENCOUNTER — TRANSCRIPTION ENCOUNTER (OUTPATIENT)
Age: 43
End: 2018-07-05

## 2018-07-05 VITALS
OXYGEN SATURATION: 100 % | HEART RATE: 84 BPM | SYSTOLIC BLOOD PRESSURE: 149 MMHG | TEMPERATURE: 98 F | DIASTOLIC BLOOD PRESSURE: 88 MMHG | RESPIRATION RATE: 16 BRPM

## 2018-07-05 LAB
ANION GAP SERPL CALC-SCNC: 8 MMOL/L — SIGNIFICANT CHANGE UP (ref 5–17)
BUN SERPL-MCNC: 13 MG/DL — SIGNIFICANT CHANGE UP (ref 7–18)
CALCIUM SERPL-MCNC: 8.9 MG/DL — SIGNIFICANT CHANGE UP (ref 8.4–10.5)
CHLORIDE SERPL-SCNC: 105 MMOL/L — SIGNIFICANT CHANGE UP (ref 96–108)
CO2 SERPL-SCNC: 26 MMOL/L — SIGNIFICANT CHANGE UP (ref 22–31)
CREAT SERPL-MCNC: 0.85 MG/DL — SIGNIFICANT CHANGE UP (ref 0.5–1.3)
GLUCOSE SERPL-MCNC: 105 MG/DL — HIGH (ref 70–99)
HCT VFR BLD CALC: 42 % — SIGNIFICANT CHANGE UP (ref 39–50)
HGB BLD-MCNC: 14.5 G/DL — SIGNIFICANT CHANGE UP (ref 13–17)
MCHC RBC-ENTMCNC: 33.3 PG — SIGNIFICANT CHANGE UP (ref 27–34)
MCHC RBC-ENTMCNC: 34.4 GM/DL — SIGNIFICANT CHANGE UP (ref 32–36)
MCV RBC AUTO: 96.6 FL — SIGNIFICANT CHANGE UP (ref 80–100)
PLATELET # BLD AUTO: 159 K/UL — SIGNIFICANT CHANGE UP (ref 150–400)
POTASSIUM SERPL-MCNC: 3.9 MMOL/L — SIGNIFICANT CHANGE UP (ref 3.5–5.3)
POTASSIUM SERPL-SCNC: 3.9 MMOL/L — SIGNIFICANT CHANGE UP (ref 3.5–5.3)
RBC # BLD: 4.35 M/UL — SIGNIFICANT CHANGE UP (ref 4.2–5.8)
RBC # FLD: 10.6 % — SIGNIFICANT CHANGE UP (ref 10.3–14.5)
SODIUM SERPL-SCNC: 139 MMOL/L — SIGNIFICANT CHANGE UP (ref 135–145)
WBC # BLD: 7.7 K/UL — SIGNIFICANT CHANGE UP (ref 3.8–10.5)
WBC # FLD AUTO: 7.7 K/UL — SIGNIFICANT CHANGE UP (ref 3.8–10.5)

## 2018-07-05 PROCEDURE — 78452 HT MUSCLE IMAGE SPECT MULT: CPT

## 2018-07-05 PROCEDURE — 93306 TTE W/DOPPLER COMPLETE: CPT

## 2018-07-05 PROCEDURE — 93018 CV STRESS TEST I&R ONLY: CPT

## 2018-07-05 PROCEDURE — 99239 HOSP IP/OBS DSCHRG MGMT >30: CPT

## 2018-07-05 PROCEDURE — 93005 ELECTROCARDIOGRAM TRACING: CPT

## 2018-07-05 PROCEDURE — 70450 CT HEAD/BRAIN W/O DYE: CPT

## 2018-07-05 PROCEDURE — 93017 CV STRESS TEST TRACING ONLY: CPT

## 2018-07-05 PROCEDURE — 80048 BASIC METABOLIC PNL TOTAL CA: CPT

## 2018-07-05 PROCEDURE — 82962 GLUCOSE BLOOD TEST: CPT

## 2018-07-05 PROCEDURE — A9502: CPT

## 2018-07-05 PROCEDURE — 84100 ASSAY OF PHOSPHORUS: CPT

## 2018-07-05 PROCEDURE — 84443 ASSAY THYROID STIM HORMONE: CPT

## 2018-07-05 PROCEDURE — 80061 LIPID PANEL: CPT

## 2018-07-05 PROCEDURE — 74174 CTA ABD&PLVS W/CONTRAST: CPT

## 2018-07-05 PROCEDURE — 83735 ASSAY OF MAGNESIUM: CPT

## 2018-07-05 PROCEDURE — 81001 URINALYSIS AUTO W/SCOPE: CPT

## 2018-07-05 PROCEDURE — 83036 HEMOGLOBIN GLYCOSYLATED A1C: CPT

## 2018-07-05 PROCEDURE — 84484 ASSAY OF TROPONIN QUANT: CPT

## 2018-07-05 PROCEDURE — 93016 CV STRESS TEST SUPVJ ONLY: CPT

## 2018-07-05 PROCEDURE — 82553 CREATINE MB FRACTION: CPT

## 2018-07-05 PROCEDURE — 78452 HT MUSCLE IMAGE SPECT MULT: CPT | Mod: 26

## 2018-07-05 PROCEDURE — 80307 DRUG TEST PRSMV CHEM ANLYZR: CPT

## 2018-07-05 PROCEDURE — 99285 EMERGENCY DEPT VISIT HI MDM: CPT | Mod: 25

## 2018-07-05 PROCEDURE — 72125 CT NECK SPINE W/O DYE: CPT

## 2018-07-05 PROCEDURE — 85379 FIBRIN DEGRADATION QUANT: CPT

## 2018-07-05 PROCEDURE — 80053 COMPREHEN METABOLIC PANEL: CPT

## 2018-07-05 PROCEDURE — 81003 URINALYSIS AUTO W/O SCOPE: CPT

## 2018-07-05 PROCEDURE — 96372 THER/PROPH/DIAG INJ SC/IM: CPT

## 2018-07-05 PROCEDURE — 82607 VITAMIN B-12: CPT

## 2018-07-05 PROCEDURE — 82550 ASSAY OF CK (CPK): CPT

## 2018-07-05 PROCEDURE — 82306 VITAMIN D 25 HYDROXY: CPT

## 2018-07-05 PROCEDURE — 85027 COMPLETE CBC AUTOMATED: CPT

## 2018-07-05 RX ORDER — ENOXAPARIN SODIUM 100 MG/ML
40 INJECTION SUBCUTANEOUS DAILY
Qty: 0 | Refills: 0 | Status: DISCONTINUED | OUTPATIENT
Start: 2018-07-05 | End: 2018-07-05

## 2018-07-05 RX ORDER — CHOLECALCIFEROL (VITAMIN D3) 125 MCG
2000 CAPSULE ORAL DAILY
Qty: 0 | Refills: 0 | Status: DISCONTINUED | OUTPATIENT
Start: 2018-07-05 | End: 2018-07-05

## 2018-07-05 RX ORDER — ASPIRIN/CALCIUM CARB/MAGNESIUM 324 MG
1 TABLET ORAL
Qty: 15 | Refills: 0 | OUTPATIENT
Start: 2018-07-05 | End: 2018-07-19

## 2018-07-05 RX ORDER — NICOTINE POLACRILEX 2 MG
1 GUM BUCCAL
Qty: 30 | Refills: 0 | OUTPATIENT
Start: 2018-07-05 | End: 2018-08-03

## 2018-07-05 RX ORDER — METOPROLOL TARTRATE 50 MG
0.5 TABLET ORAL
Qty: 30 | Refills: 0 | OUTPATIENT
Start: 2018-07-05 | End: 2018-08-03

## 2018-07-05 RX ORDER — GABAPENTIN 400 MG/1
1 CAPSULE ORAL
Qty: 90 | Refills: 0 | OUTPATIENT
Start: 2018-07-05 | End: 2018-08-03

## 2018-07-05 RX ORDER — ATORVASTATIN CALCIUM 80 MG/1
1 TABLET, FILM COATED ORAL
Qty: 15 | Refills: 0 | OUTPATIENT
Start: 2018-07-05 | End: 2018-07-19

## 2018-07-05 RX ORDER — MECLIZINE HCL 12.5 MG
1 TABLET ORAL
Qty: 90 | Refills: 0 | OUTPATIENT
Start: 2018-07-05 | End: 2018-08-03

## 2018-07-05 RX ORDER — LISINOPRIL 2.5 MG/1
20 TABLET ORAL DAILY
Qty: 0 | Refills: 0 | Status: DISCONTINUED | OUTPATIENT
Start: 2018-07-05 | End: 2018-07-05

## 2018-07-05 RX ORDER — CHOLECALCIFEROL (VITAMIN D3) 125 MCG
1 CAPSULE ORAL
Qty: 30 | Refills: 0 | OUTPATIENT
Start: 2018-07-05 | End: 2018-08-03

## 2018-07-05 RX ORDER — LISINOPRIL 2.5 MG/1
1 TABLET ORAL
Qty: 15 | Refills: 0 | OUTPATIENT
Start: 2018-07-05 | End: 2018-07-19

## 2018-07-05 RX ORDER — ACETAMINOPHEN 500 MG
2 TABLET ORAL
Qty: 120 | Refills: 0 | OUTPATIENT
Start: 2018-07-05 | End: 2018-07-19

## 2018-07-05 RX ADMIN — Medication 81 MILLIGRAM(S): at 13:33

## 2018-07-05 RX ADMIN — LISINOPRIL 10 MILLIGRAM(S): 2.5 TABLET ORAL at 06:39

## 2018-07-05 RX ADMIN — Medication 12.5 MILLIGRAM(S): at 06:39

## 2018-07-05 RX ADMIN — ENOXAPARIN SODIUM 40 MILLIGRAM(S): 100 INJECTION SUBCUTANEOUS at 13:33

## 2018-07-05 RX ADMIN — LISINOPRIL 20 MILLIGRAM(S): 2.5 TABLET ORAL at 10:30

## 2018-07-05 RX ADMIN — Medication 1 PATCH: at 13:34

## 2018-07-05 NOTE — DISCHARGE NOTE ADULT - HOSPITAL COURSE
43M PMHx HLD, who presented to ED c/o lightheadedness and frontal headache x1 week, along with left arm pain and LUQ pain that started last night. Patient refers having blood pressure checked by his sister, which revealed SBP: 185. Symptoms were not resolving and patient decided to come to ED. In ED  BP: 202/103 mmHg, HR: 58 bpm, RR: 16 rpm, SaO2: 98% RA, T: 98F; s/p Labetalol 10mg IVP , ECG: NSR VR @69bpm, no ST changes, TWI lead IIIT1: 0.058, s/p full dose Lovenox,  mg, Lipitor 80 mg HS, Brilinta 180 mg prophylactically given by ED physician. CT head showed no any intracranial bleeding but prominent adenoids. Patient's BP reduced and patient was admitted to the medical floor. Third troponin was high so EKG was done which showed inverted t waves in v3 v4 v5.cardiology was consulted. ECHO was normal. Nuclear stress test ruled out ischemia. CT angio abdomen and pelvis had no significant findings. CT cervical spine showed  Intervertebral disc space narrowing at C5-C6 and C6-C7 and resulting moderate spinal canal stenosis at those levels suggested to follow up with MRI of the cervical spine. patient's blood pressure is controlled with the antihypertensive medications.     Patient is stable for discharge home. Patient to continue with meclizine, neurontin for dizziness and arm pain. Patient to continued on all hypertensive medications as listed above. Patient instructed to follow-up as outpatient with further MRI of the cervical spine. Discussed above with attending Dr. Brannon

## 2018-07-05 NOTE — CONSULT NOTE ADULT - ASSESSMENT
No chest pain, would recommend continuing current medications.  F/U stress test. stable cardiac condition, would recommend continuing current medications.

## 2018-07-05 NOTE — DISCHARGE NOTE ADULT - CARE PLAN
Principal Discharge DX:	Hypertensive emergency  Goal:	BP < 140/90  Assessment and plan of treatment:	Please continue with all medications as above. You were found to have uncontrolled high blood pressure. You had an extensive cardiac workup including EKG, Cardiac enzymes, echocardiogram, and stress test, results of which were normal.  Continue to monitor your blood pressure twice per week.  Secondary Diagnosis:	Cervical spinal stenosis  Goal:	outpatient MRI of cervical spine  Assessment and plan of treatment:	You were found to have narrowing of the cervical spine on CT scan of the neck. These may be accounting for your symptoms of arm pain. Continue with neurontin as above.  Secondary Diagnosis:	HLD (hyperlipidemia)  Assessment and plan of treatment:	Moderate- to high-intensity statin recommended and prescribed because your ASCVD 10-year risk is >7.5%. Please continue with atorvastatin as above. You are encouraged to quit smoking as this can increase your risk for cardiovascular events, including but not limited to stroke, heart attack, heart failure, etc.  Secondary Diagnosis:	Vitamin D deficiency  Assessment and plan of treatment:	You were found to be deficient in Vitamin D. Please continue with Vitamin D supplementation as above. Follow-up with your primary care doctor for a repeat Vitamin D level in 6-8 weeks.  Secondary Diagnosis:	Nasopharyngeal mass  Assessment and plan of treatment:	You were found to have nasopharyngeal mass on CT head, which radiology describes as adenoids. Please follow-up with ENT doctor as outpatient for further workup. In the meanwhile, you may take meclizine, as prescribed above, as needed for dizziness/vertigo.

## 2018-07-05 NOTE — DISCHARGE NOTE ADULT - PLAN OF CARE
BP < 140/90 Please continue with all medications as above. You were found to have uncontrolled high blood pressure. You had an extensive cardiac workup including EKG, Cardiac enzymes, echocardiogram, and stress test, results of which were normal.  Continue to monitor your blood pressure twice per week. outpatient MRI of cervical spine You were found to have narrowing of the cervical spine on CT scan of the neck. These may be accounting for your symptoms of arm pain. Continue with neurontin as above. Moderate- to high-intensity statin recommended and prescribed because your ASCVD 10-year risk is >7.5%. Please continue with atorvastatin as above. You are encouraged to quit smoking as this can increase your risk for cardiovascular events, including but not limited to stroke, heart attack, heart failure, etc. You were found to be deficient in Vitamin D. Please continue with Vitamin D supplementation as above. Follow-up with your primary care doctor for a repeat Vitamin D level in 6-8 weeks. You were found to have nasopharyngeal mass on CT head, which radiology describes as adenoids. Please follow-up with ENT doctor as outpatient for further workup. In the meanwhile, you may take meclizine, as prescribed above, as needed for dizziness/vertigo.

## 2018-07-05 NOTE — DISCHARGE NOTE ADULT - MEDICATION SUMMARY - MEDICATIONS TO TAKE
I will START or STAY ON the medications listed below when I get home from the hospital:    acetaminophen 325 mg oral tablet  -- 2 tab(s) by mouth every 6 hours, As needed, Mild Pain (1 - 3)  -- Indication: For Left arm pain    aspirin 81 mg oral tablet, chewable  -- 1 tab(s) by mouth once a day  -- Indication: For prophylaxis    lisinopril 20 mg oral tablet  -- 1 tab(s) by mouth once a day  -- Indication: For Hypertensive emergency    gabapentin 100 mg oral capsule  -- 1 cap(s) by mouth 3 times a day, As Needed for numbness/tingling/arm pain  -- It is very important that you take or use this exactly as directed.  Do not skip doses or discontinue unless directed by your doctor.  May cause drowsiness.  Alcohol may intensify this effect.  Use care when operating dangerous machinery.    -- Indication: For Left arm pain    meclizine 25 mg oral tablet  -- 1 tab(s) by mouth every 8 hours, As Needed -for dizziness   -- May cause drowsiness.  Alcohol may intensify this effect.  Use care when operating dangerous machinery.    -- Indication: For Dizziness    atorvastatin 40 mg oral tablet  -- 1 tab(s) by mouth once a day (at bedtime)  -- Indication: For HLD (hyperlipidemia)    metoprolol tartrate 25 mg oral tablet  -- 0.5 tab(s) by mouth 2 times a day   -- It is very important that you take or use this exactly as directed.  Do not skip doses or discontinue unless directed by your doctor.  May cause drowsiness.  Alcohol may intensify this effect.  Use care when operating dangerous machinery.  Some non-prescription drugs may aggravate your condition.  Read all labels carefully.  If a warning appears, check with your doctor before taking.  Take with food or milk.  This drug may impair the ability to drive or operate machinery.  Use care until you become familiar with its effects.    -- Indication: For Hypertensive emergency    Fish Oil 1000 mg oral capsule  -- 1 cap(s) by mouth 2 times a day  -- Indication: For Supplement    nicotine 21 mg/24 hr transdermal film, extended release  -- 1 patch by transdermal patch once a day  -- Indication: For Smoking addiction    cholecalciferol 2000 intl units oral tablet  -- 1 tab(s) by mouth once a day   -- Indication: For Vitamin D Insufficiency

## 2018-07-05 NOTE — DISCHARGE NOTE ADULT - CARE PROVIDER_API CALL
Joel Charles (MD), Cardiovascular Disease; Internal Medicine  [address] [place] [address]  Phone: [phone]  Fax: [phone]

## 2018-07-05 NOTE — DISCHARGE NOTE ADULT - PATIENT PORTAL LINK FT
You can access the [facility] Patient Portal, offered by [facility], by registering with the following website: [email or web address]

## 2018-08-06 PROBLEM — E78.5 HYPERLIPIDEMIA, UNSPECIFIED: Chronic | Status: ACTIVE | Noted: 2018-07-03

## 2018-08-14 ENCOUNTER — EMERGENCY (EMERGENCY)
Facility: HOSPITAL | Age: 43
LOS: 1 days | Discharge: HOME | End: 2018-08-14
Attending: EMERGENCY MEDICINE | Admitting: EMERGENCY MEDICINE
Payer: MEDICAID

## 2018-08-14 VITALS
SYSTOLIC BLOOD PRESSURE: 127 MMHG | HEART RATE: 60 BPM | RESPIRATION RATE: 16 BRPM | DIASTOLIC BLOOD PRESSURE: 79 MMHG | OXYGEN SATURATION: 99 % | TEMPERATURE: 98 F

## 2018-08-14 PROCEDURE — 99283 EMERGENCY DEPT VISIT LOW MDM: CPT

## 2018-08-14 RX ORDER — ACETAMINOPHEN 500 MG
1 TABLET ORAL
Qty: 50 | Refills: 0 | OUTPATIENT
Start: 2018-08-14

## 2018-08-14 RX ORDER — GABAPENTIN 400 MG/1
2 CAPSULE ORAL
Qty: 84 | Refills: 0 | OUTPATIENT
Start: 2018-08-14 | End: 2018-08-27

## 2018-08-14 NOTE — ED PROVIDER NOTE - OBJECTIVE STATEMENT
Kandice: 43 M, HTN (recent stress test neg), p/w 1.5 mos L neck pain radiating to L arm w/ movement. Physical therapy appt. next week. CT shows C5-6 and C-6-7 DJD. Has chronic dizziness when smokes; takes Meclizine.

## 2018-08-14 NOTE — ED PROVIDER NOTE - CARE PLAN
Principal Discharge DX:	Radiculopathy affecting upper extremity  Secondary Diagnosis:	Degenerative joint disease Principal Discharge DX:	Radiculopathy affecting upper extremity  Assessment and plan of treatment:	Take medications as prescribed. Follow up with PT and your healthcare provider about this issue (these issues): cervical radiculopathy. Return if pain not controlled with oral medications or if have physical weakness not related to pain.  Secondary Diagnosis:	Degenerative joint disease

## 2018-08-14 NOTE — ED ADULT TRIAGE NOTE - CHIEF COMPLAINT QUOTE
c/o left side neck pain x 1.5 months, saw PMD and was placed on Gabapentin with no relief.  Plan is for PT as recommended by ENT. PMH: HTN, vertigo,

## 2018-08-14 NOTE — ED PROVIDER NOTE - MUSCULOSKELETAL, MLM
Spine appears normal, range of motion is not limited, no muscle or joint tenderness. Mild tenderness L neck (no LAD). L shoulder FROM.

## 2018-08-14 NOTE — ED PROVIDER NOTE - PLAN OF CARE
Take medications as prescribed. Follow up with PT and your healthcare provider about this issue (these issues): cervical radiculopathy. Return if pain not controlled with oral medications or if have physical weakness not related to pain.

## 2018-08-17 ENCOUNTER — APPOINTMENT (OUTPATIENT)
Dept: ORTHOPEDIC SURGERY | Facility: CLINIC | Age: 43
End: 2018-08-17
Payer: MEDICAID

## 2018-08-17 VITALS
HEART RATE: 71 BPM | HEIGHT: 67 IN | DIASTOLIC BLOOD PRESSURE: 99 MMHG | BODY MASS INDEX: 29.03 KG/M2 | OXYGEN SATURATION: 97 % | SYSTOLIC BLOOD PRESSURE: 150 MMHG | WEIGHT: 185 LBS

## 2018-08-17 DIAGNOSIS — M54.12 RADICULOPATHY, CERVICAL REGION: ICD-10-CM

## 2018-08-17 DIAGNOSIS — M48.02 SPINAL STENOSIS, CERVICAL REGION: ICD-10-CM

## 2018-08-17 PROCEDURE — 99203 OFFICE O/P NEW LOW 30 MIN: CPT

## 2018-08-21 PROBLEM — M54.12 CERVICAL RADICULOPATHY: Status: ACTIVE | Noted: 2018-08-21

## 2018-08-21 PROBLEM — M48.02 CERVICAL STENOSIS OF SPINE: Status: ACTIVE | Noted: 2018-08-21

## 2020-11-21 ENCOUNTER — EMERGENCY (EMERGENCY)
Facility: HOSPITAL | Age: 45
LOS: 1 days | Discharge: HOME | End: 2020-11-21
Attending: STUDENT IN AN ORGANIZED HEALTH CARE EDUCATION/TRAINING PROGRAM
Payer: MEDICAID

## 2020-11-21 VITALS
SYSTOLIC BLOOD PRESSURE: 155 MMHG | OXYGEN SATURATION: 98 % | WEIGHT: 191.8 LBS | TEMPERATURE: 98 F | RESPIRATION RATE: 18 BRPM | HEART RATE: 78 BPM | HEIGHT: 68 IN | DIASTOLIC BLOOD PRESSURE: 100 MMHG

## 2020-11-21 LAB
ALBUMIN SERPL ELPH-MCNC: 3.9 G/DL — SIGNIFICANT CHANGE UP (ref 3.5–5)
ALP SERPL-CCNC: 82 U/L — SIGNIFICANT CHANGE UP (ref 40–120)
ALT FLD-CCNC: 29 U/L DA — SIGNIFICANT CHANGE UP (ref 10–60)
ANION GAP SERPL CALC-SCNC: 6 MMOL/L — SIGNIFICANT CHANGE UP (ref 5–17)
AST SERPL-CCNC: 25 U/L — SIGNIFICANT CHANGE UP (ref 10–40)
BASOPHILS # BLD AUTO: 0 K/UL — SIGNIFICANT CHANGE UP (ref 0–0.2)
BASOPHILS NFR BLD AUTO: 0 % — SIGNIFICANT CHANGE UP (ref 0–2)
BILIRUB SERPL-MCNC: 0.4 MG/DL — SIGNIFICANT CHANGE UP (ref 0.2–1.2)
BUN SERPL-MCNC: 14 MG/DL — SIGNIFICANT CHANGE UP (ref 7–18)
CALCIUM SERPL-MCNC: 8.9 MG/DL — SIGNIFICANT CHANGE UP (ref 8.4–10.5)
CHLORIDE SERPL-SCNC: 104 MMOL/L — SIGNIFICANT CHANGE UP (ref 96–108)
CO2 SERPL-SCNC: 27 MMOL/L — SIGNIFICANT CHANGE UP (ref 22–31)
CREAT SERPL-MCNC: 0.81 MG/DL — SIGNIFICANT CHANGE UP (ref 0.5–1.3)
D DIMER BLD IA.RAPID-MCNC: <150 NG/ML DDU — SIGNIFICANT CHANGE UP
EOSINOPHIL # BLD AUTO: 0.43 K/UL — SIGNIFICANT CHANGE UP (ref 0–0.5)
EOSINOPHIL NFR BLD AUTO: 4 % — SIGNIFICANT CHANGE UP (ref 0–6)
GLUCOSE SERPL-MCNC: 104 MG/DL — HIGH (ref 70–99)
HCT VFR BLD CALC: 40.6 % — SIGNIFICANT CHANGE UP (ref 39–50)
HGB BLD-MCNC: 13.9 G/DL — SIGNIFICANT CHANGE UP (ref 13–17)
LIDOCAIN IGE QN: 286 U/L — SIGNIFICANT CHANGE UP (ref 73–393)
LYMPHOCYTES # BLD AUTO: 4.32 K/UL — HIGH (ref 1–3.3)
LYMPHOCYTES # BLD AUTO: 40 % — SIGNIFICANT CHANGE UP (ref 13–44)
MAGNESIUM SERPL-MCNC: 1.9 MG/DL — SIGNIFICANT CHANGE UP (ref 1.6–2.6)
MANUAL SMEAR VERIFICATION: SIGNIFICANT CHANGE UP
MCHC RBC-ENTMCNC: 33.5 PG — SIGNIFICANT CHANGE UP (ref 27–34)
MCHC RBC-ENTMCNC: 34.2 GM/DL — SIGNIFICANT CHANGE UP (ref 32–36)
MCV RBC AUTO: 97.8 FL — SIGNIFICANT CHANGE UP (ref 80–100)
MONOCYTES # BLD AUTO: 0.54 K/UL — SIGNIFICANT CHANGE UP (ref 0–0.9)
MONOCYTES NFR BLD AUTO: 5 % — SIGNIFICANT CHANGE UP (ref 2–14)
NEUTROPHILS # BLD AUTO: 5.4 K/UL — SIGNIFICANT CHANGE UP (ref 1.8–7.4)
NEUTROPHILS NFR BLD AUTO: 50 % — SIGNIFICANT CHANGE UP (ref 43–77)
NRBC # BLD: 0 /100 — SIGNIFICANT CHANGE UP (ref 0–0)
NT-PROBNP SERPL-SCNC: 50 PG/ML — SIGNIFICANT CHANGE UP (ref 0–125)
PLAT MORPH BLD: NORMAL — SIGNIFICANT CHANGE UP
PLATELET # BLD AUTO: 203 K/UL — SIGNIFICANT CHANGE UP (ref 150–400)
POTASSIUM SERPL-MCNC: 4 MMOL/L — SIGNIFICANT CHANGE UP (ref 3.5–5.3)
POTASSIUM SERPL-SCNC: 4 MMOL/L — SIGNIFICANT CHANGE UP (ref 3.5–5.3)
PROT SERPL-MCNC: 8 G/DL — SIGNIFICANT CHANGE UP (ref 6–8.3)
RBC # BLD: 4.15 M/UL — LOW (ref 4.2–5.8)
RBC # FLD: 11.9 % — SIGNIFICANT CHANGE UP (ref 10.3–14.5)
RBC BLD AUTO: NORMAL — SIGNIFICANT CHANGE UP
SODIUM SERPL-SCNC: 137 MMOL/L — SIGNIFICANT CHANGE UP (ref 135–145)
TROPONIN I SERPL-MCNC: 0.02 NG/ML — SIGNIFICANT CHANGE UP (ref 0–0.04)
VARIANT LYMPHS # BLD: 1 % — SIGNIFICANT CHANGE UP (ref 0–6)
WBC # BLD: 10.8 K/UL — HIGH (ref 3.8–10.5)
WBC # FLD AUTO: 10.8 K/UL — HIGH (ref 3.8–10.5)

## 2020-11-21 PROCEDURE — 85379 FIBRIN DEGRADATION QUANT: CPT

## 2020-11-21 PROCEDURE — 99283 EMERGENCY DEPT VISIT LOW MDM: CPT

## 2020-11-21 PROCEDURE — 83690 ASSAY OF LIPASE: CPT

## 2020-11-21 PROCEDURE — 36415 COLL VENOUS BLD VENIPUNCTURE: CPT

## 2020-11-21 PROCEDURE — 85025 COMPLETE CBC W/AUTO DIFF WBC: CPT

## 2020-11-21 PROCEDURE — 84484 ASSAY OF TROPONIN QUANT: CPT

## 2020-11-21 PROCEDURE — 83735 ASSAY OF MAGNESIUM: CPT

## 2020-11-21 PROCEDURE — 80053 COMPREHEN METABOLIC PANEL: CPT

## 2020-11-21 PROCEDURE — 83880 ASSAY OF NATRIURETIC PEPTIDE: CPT

## 2020-11-21 RX ORDER — AMLODIPINE BESYLATE 2.5 MG/1
1 TABLET ORAL
Qty: 30 | Refills: 0
Start: 2020-11-21 | End: 2020-12-20

## 2020-11-21 NOTE — ED ADULT NURSE NOTE - OBJECTIVE STATEMENT
pt from home c/o of dizziness after taking blood pressure medication dispensed by pharmacy, pt reports "the pharmacy don't carry my regular medication, they give me a generic brand and I felt dizziness after taking it"

## 2020-11-21 NOTE — ED PROVIDER NOTE - OBJECTIVE STATEMENT
45M, pmh of htn, hld, presenting with dizziness. patient reports he went to the pharmacy to get his amlodipine but was told they did not have any and was given a different medication to take. reports this medication made him feel dizzy and he would like to get his usual medication. denies changes in vision, nausea, vomiting, chest pain, shortness of breath, abdominal pain.

## 2020-11-21 NOTE — ED PROVIDER NOTE - NSFOLLOWUPINSTRUCTIONS_ED_ALL_ED_FT
You were seen in the emergency department for dizziness after changing your blood pressure medication.     Please follow-up with your primary care doctor in the next 24-48 hours.     If you have any worsening symptoms, severe chest pain, shortness of breath, nausea or vomiting, please return to the emergency department.

## 2020-11-21 NOTE — ED PROVIDER NOTE - PATIENT PORTAL LINK FT
You can access the FollowMyHealth Patient Portal offered by [facility] by registering at the following website: [email or web address]. By joining [employer]’s FollowMyHealth portal, you will also be able to view your health information using other applications (apps) compatible with our system.

## 2020-11-21 NOTE — ED PROVIDER NOTE - PROGRESS NOTE DETAILS
patient declining further workup. ambulating without difficulty. will discharge with pcp follow-up. Bola Kraus

## 2020-11-21 NOTE — ED PROVIDER NOTE - CLINICAL SUMMARY MEDICAL DECISION MAKING FREE TEXT BOX
45M presenting with dizziness after changing medication. denies any chest pain or shortness of breath. discussed chest pain/dizziness workup with patient but he declines, stating he just wants her regular prescription and recently had normal blood work done. patient consented to have blood work drawn but does not want cxr or ekg. will reassess after blood work.

## 2020-11-22 PROBLEM — I10 ESSENTIAL (PRIMARY) HYPERTENSION: Chronic | Status: ACTIVE | Noted: 2018-08-14

## 2021-11-26 ENCOUNTER — EMERGENCY (EMERGENCY)
Facility: HOSPITAL | Age: 46
LOS: 1 days | Discharge: HOME | End: 2021-11-26
Attending: EMERGENCY MEDICINE
Payer: MEDICAID

## 2021-11-26 VITALS
WEIGHT: 195.11 LBS | DIASTOLIC BLOOD PRESSURE: 89 MMHG | HEART RATE: 78 BPM | TEMPERATURE: 98 F | HEIGHT: 68 IN | RESPIRATION RATE: 17 BRPM | OXYGEN SATURATION: 96 % | SYSTOLIC BLOOD PRESSURE: 128 MMHG

## 2021-11-26 LAB
ALBUMIN SERPL ELPH-MCNC: 3.2 G/DL — LOW (ref 3.5–5)
ALP SERPL-CCNC: 71 U/L — SIGNIFICANT CHANGE UP (ref 40–120)
ALT FLD-CCNC: 82 U/L DA — HIGH (ref 10–60)
ANION GAP SERPL CALC-SCNC: 5 MMOL/L — SIGNIFICANT CHANGE UP (ref 5–17)
AST SERPL-CCNC: 51 U/L — HIGH (ref 10–40)
BASOPHILS # BLD AUTO: 0.02 K/UL — SIGNIFICANT CHANGE UP (ref 0–0.2)
BASOPHILS NFR BLD AUTO: 0.3 % — SIGNIFICANT CHANGE UP (ref 0–2)
BILIRUB SERPL-MCNC: 0.6 MG/DL — SIGNIFICANT CHANGE UP (ref 0.2–1.2)
BUN SERPL-MCNC: 14 MG/DL — SIGNIFICANT CHANGE UP (ref 7–18)
CALCIUM SERPL-MCNC: 8.4 MG/DL — SIGNIFICANT CHANGE UP (ref 8.4–10.5)
CHLORIDE SERPL-SCNC: 108 MMOL/L — SIGNIFICANT CHANGE UP (ref 96–108)
CK SERPL-CCNC: 101 U/L — SIGNIFICANT CHANGE UP (ref 35–232)
CO2 SERPL-SCNC: 26 MMOL/L — SIGNIFICANT CHANGE UP (ref 22–31)
CREAT SERPL-MCNC: 0.75 MG/DL — SIGNIFICANT CHANGE UP (ref 0.5–1.3)
EOSINOPHIL # BLD AUTO: 0.08 K/UL — SIGNIFICANT CHANGE UP (ref 0–0.5)
EOSINOPHIL NFR BLD AUTO: 1.4 % — SIGNIFICANT CHANGE UP (ref 0–6)
GLUCOSE SERPL-MCNC: 98 MG/DL — SIGNIFICANT CHANGE UP (ref 70–99)
HCT VFR BLD CALC: 42.1 % — SIGNIFICANT CHANGE UP (ref 39–50)
HGB BLD-MCNC: 14.1 G/DL — SIGNIFICANT CHANGE UP (ref 13–17)
HIV 1 & 2 AB SERPL IA.RAPID: SIGNIFICANT CHANGE UP
IMM GRANULOCYTES NFR BLD AUTO: 0.3 % — SIGNIFICANT CHANGE UP (ref 0–1.5)
LYMPHOCYTES # BLD AUTO: 2.21 K/UL — SIGNIFICANT CHANGE UP (ref 1–3.3)
LYMPHOCYTES # BLD AUTO: 37.3 % — SIGNIFICANT CHANGE UP (ref 13–44)
MCHC RBC-ENTMCNC: 32.5 PG — SIGNIFICANT CHANGE UP (ref 27–34)
MCHC RBC-ENTMCNC: 33.5 GM/DL — SIGNIFICANT CHANGE UP (ref 32–36)
MCV RBC AUTO: 97 FL — SIGNIFICANT CHANGE UP (ref 80–100)
MONOCYTES # BLD AUTO: 0.77 K/UL — SIGNIFICANT CHANGE UP (ref 0–0.9)
MONOCYTES NFR BLD AUTO: 13 % — SIGNIFICANT CHANGE UP (ref 2–14)
NEUTROPHILS # BLD AUTO: 2.82 K/UL — SIGNIFICANT CHANGE UP (ref 1.8–7.4)
NEUTROPHILS NFR BLD AUTO: 47.7 % — SIGNIFICANT CHANGE UP (ref 43–77)
NRBC # BLD: 0 /100 WBCS — SIGNIFICANT CHANGE UP (ref 0–0)
PLATELET # BLD AUTO: 182 K/UL — SIGNIFICANT CHANGE UP (ref 150–400)
POTASSIUM SERPL-MCNC: 3.7 MMOL/L — SIGNIFICANT CHANGE UP (ref 3.5–5.3)
POTASSIUM SERPL-SCNC: 3.7 MMOL/L — SIGNIFICANT CHANGE UP (ref 3.5–5.3)
PROT SERPL-MCNC: 7.5 G/DL — SIGNIFICANT CHANGE UP (ref 6–8.3)
RBC # BLD: 4.34 M/UL — SIGNIFICANT CHANGE UP (ref 4.2–5.8)
RBC # FLD: 11.3 % — SIGNIFICANT CHANGE UP (ref 10.3–14.5)
SODIUM SERPL-SCNC: 139 MMOL/L — SIGNIFICANT CHANGE UP (ref 135–145)
WBC # BLD: 5.92 K/UL — SIGNIFICANT CHANGE UP (ref 3.8–10.5)
WBC # FLD AUTO: 5.92 K/UL — SIGNIFICANT CHANGE UP (ref 3.8–10.5)

## 2021-11-26 PROCEDURE — 99283 EMERGENCY DEPT VISIT LOW MDM: CPT | Mod: 25

## 2021-11-26 PROCEDURE — 36415 COLL VENOUS BLD VENIPUNCTURE: CPT

## 2021-11-26 PROCEDURE — 99284 EMERGENCY DEPT VISIT MOD MDM: CPT

## 2021-11-26 PROCEDURE — 82550 ASSAY OF CK (CPK): CPT

## 2021-11-26 PROCEDURE — 86703 HIV-1/HIV-2 1 RESULT ANTBDY: CPT

## 2021-11-26 PROCEDURE — 80053 COMPREHEN METABOLIC PANEL: CPT

## 2021-11-26 PROCEDURE — 71046 X-RAY EXAM CHEST 2 VIEWS: CPT

## 2021-11-26 PROCEDURE — 71046 X-RAY EXAM CHEST 2 VIEWS: CPT | Mod: 26

## 2021-11-26 PROCEDURE — 85025 COMPLETE CBC W/AUTO DIFF WBC: CPT

## 2021-11-26 NOTE — ED PROVIDER NOTE - OBJECTIVE STATEMENT
46 yr old male with hx of smoking, HTN, HLD and on statin presents to ed c/o myalgia x 2 weeks. pt had fever and uri sx but resolved. no sob, no cp, no dysuria, no rashes, no joint pain, no abd pain, no n/v. denies any concern for sti.

## 2021-11-26 NOTE — ED ADULT NURSE NOTE - GENITOURINARY ASSESSMENT
Examination: PORTABLE CHEST 1V

 

History: Reason: hypoxia / Spl. Instructions:  / History: 

 

Comparison/Correlation: None

 

Findings: Portable upright frontal view chest was obtained. Sternal wires 

and mediastinal clips present. Heart size is slightly enlarged. No 

pneumothorax. Vasculature is within upper limits of normal. No infiltrate 

or pleural effusion. No acute bony process.

 

Impression:

No suspicious process..

 

Electronically signed by: Kali Fermin MD (6/30/2020 11:55 AM) [id number] - - -

## 2021-11-26 NOTE — ED PROVIDER NOTE - PATIENT PORTAL LINK FT
You can access the FollowMyHealth Patient Portal offered by [facility] by registering at the following website: [email or web address]. By joining [employer]’s FollowMyHealth portal, you will also be able to view your health information using other applications (apps) compatible with our system.

## 2021-11-26 NOTE — ED PROVIDER NOTE - PROGRESS NOTE DETAILS
quiroz: work up shows no organic cause. cxr no sign of consolication or mass.  dx myalgia. see your pcp. left ambulatory.  return precautions given. Christine: patient extremely rude and insulting, curing and telling staff we are lupe[id when told no cause of his sx. pt states, " I came to the hospital and I expect an answer...you have nothing."

## 2021-11-26 NOTE — ED ADULT NURSE NOTE - CINV DISCH MEDS REVIEWED YN
1300 TO FLOOR AMBULATORY FOR INJECTION OF B12. AAO X 3. ANTONIA RN  1319 B12 INJECTION GIVEN IN LT. DELTOID. TOLERATED WELL. ANTONIA RN  132 D/C HOME VIA POV. CONDITION GOOD. DENIES PROBLEMS UPON D/C HOME. ANTONIA RN   No

## 2021-11-26 NOTE — ED ADULT TRIAGE NOTE - HEART RATE (BEATS/MIN)
HISTORY OF PRESENT ILLNESS     HPI     This is a 50 year old female who presents today for a medicare wellness visit.    She has MS with right sided weakness on ocrevus. She has a hx of seizure disorder. She sees neurology. She has hypothryoidism and is due for TSH. She has high chol. She is due for recheck. She has never been on medication.    PREVENTIVE HEALTH:    Diet:  well balanced  Exercise:  sedentary uses walker    Last lipid testing:  elevated    Lab Results   Component Value Date    CHOLESTEROL 274 (H) 08/20/2020    CHOLESTEROL 265 (H) 06/19/2019    HDL 46 (L) 08/20/2020    HDL 42 (L) 06/19/2019    CALCLDL 186 (H) 08/20/2020    CALCLDL 187 (H) 06/19/2019    TRIGLYCERIDE 212 (H) 08/20/2020    TRIGLYCERIDE 178 (H) 06/19/2019       Last glucose:  normal    Lab Results   Component Value Date    GLUCOSE 84 05/14/2021    GLUCOSE 71 12/04/2020       Immunizations:  TDaP/Td:  up to date  Pneumococcal:  up to date  Influenza:  due  Zoster:  up to date       Immunization History   Administered Date(s) Administered   • COVID-19 Pfizer-BioNtCardiostrong 04/01/2021, 04/22/2021, 09/08/2021   • Influenza, injectable, quadrivalent 11/11/2014, 10/06/2015, 10/25/2016, 11/15/2017, 09/26/2018   • Influenza, injectable, quadrivalent, preservative-free 11/12/2019, 10/28/2020   • Influenza, seasonal, injectable, preservative free 09/21/2009   • Influenza, seasonal, injectable, trivalent 10/20/2004, 09/21/2009, 10/22/2010, 11/17/2011, 12/04/2012, 10/22/2013, 11/11/2014   • Pneumococcal Conjugate 13 valent 05/04/2015   • Pneumococcal polysaccharide, adult, 23 valent 10/22/2010   • Td:Adult type tetanus/diphtheria 04/12/1999   • Tdap 06/10/2015       Health Maintenance:  Colonoscopy:  up to date  DEXA scan:  not indicated  Pap smear:  up to date with gyn- s/p MOISÉS  Mammogram:  up to date   She is not opioid medication    I reviewed the Health Risk Assessment in its entirety. The following items on the Medicare Health Risk Assessment were  found to be positive,    4.) During the past 4 weeks, what was the hardest physical activity you could do for at least 2 minutes?: Light     5.) Do you do moderate to strenuous exercise (brisk walk) for about 20 minutes for 3 or more days per week?: No, I usually do not exercise this much     6 a.) How many servings of Fruits and Vegetables do you have each day ( 1 serving = 1 piece of fruit, 1/2 cup fruits or vegetables): 1 per day     6 b.) How many servings of High Fiber / Whole Grain Foods to you have each day ( 1 serving = 1 cup cold cereal, 1/2 cup cooked cereal, 1 slice bread): None     7a.) Have you had a fall in the past year?: Yes     7b.) Do you feel unsteady when standing or walking?: Yes     7c.) Do you worry about falling?: Yes     11b.) Bowel control problems: Sometimes     11e.) Tiredness or Fatigue: Always     13.) Do you need help with any of the following activities?: Do your housework or laundry     14.) During the past 4 weeks, was someone available to help if you needed and wanted help?: Yes, some         Vision and Hearing screens: performed and reviewed    No exam data present    Advance Directive:   The patient has the following documents:  Power of  for Health Care    Cognitive Assessment: no evidence of cognitive dysfunction by direct observation    Recent PHQ 2/9 Score    PHQ 2:  Date Adult PHQ 2 Score Adult PHQ 2 Interpretation   9/7/2021 0 No further screening needed       PHQ 9:       DEPRESSION ASSESSMENT/PLAN:  Depression screening is negative no further plan needed.     Body mass index is 39.79 kg/m².    BMI ASSESSMENT/PLAN:  Patient is overweight.    Journal food intake daily     Care team: Dr. Tejada pulmonary. Dr. Tiago Parsons    PAST MEDICAL, FAMILY AND SOCIAL HISTORY     The following histories were personally reviewed and updated.  I have reviewed the patient's medications and allergies, past medical, surgical, social and family history, updating these as  78 appropriate.  See Histories section of the EMR for a display of this information.      REVIEW OF SYSTEMS     Review of Systems   All other systems reviewed and are negative.      PHYSICAL EXAM     Physical Exam  Vitals and nursing note reviewed.   Constitutional:       General: She is not in acute distress.     Appearance: Normal appearance. She is well-developed.   Eyes:      General: No scleral icterus.     Conjunctiva/sclera: Conjunctivae normal.   Cardiovascular:      Rate and Rhythm: Normal rate and regular rhythm.      Heart sounds: Normal heart sounds.   Pulmonary:      Effort: Pulmonary effort is normal.      Breath sounds: Normal breath sounds.   Abdominal:      Palpations: Abdomen is soft.      Tenderness: There is no abdominal tenderness.   Skin:     General: Skin is warm and dry.   Neurological:      Mental Status: She is alert. She is not disoriented.         ASSESSMENT/PLAN     ASSESSMENT:  1. Medicare annual wellness visit, subsequent    2. Other generalized epilepsy, not intractable, without status epilepticus (CMS/HCC) - controlled on medication   3. Multiple sclerosis (CMS/HCC) - on ocrevus   4. High cholesterol - due for recheck. Recommend low chol diet   5. Other specified hypothyroidism - due for TSH   6. Body mass index is 39.79 kg/m². see above      PLAN:  Orders Placed This Encounter   • Lipid Panel With Reflex   • Comprehensive Metabolic Panel   • Thyroid Stimulating Hormone   • olopatadine (Patanol) 0.1 % ophthalmic solution       Return in about 1 year (around 9/8/2022) for Medicare Wellness Visit.

## 2021-11-26 NOTE — ED ADULT TRIAGE NOTE - ESI TRIAGE ACUITY LEVEL, MLM
"Jose Luis Gonzáles is a 46 year old male who is being evaluated via a telephone visit.      The patient has been notified of following:     \"This telephone visit will be conducted via a call between you and your physician/provider. We have found that certain health care needs can be provided without the need for a physical exam.  This service lets us provide the care you need with a short phone conversation.  If a prescription is necessary we can send it directly to your pharmacy.  If lab work is needed we can place an order for that and you can then stop by our lab to have the test done at a later time.    We will bill your insurance company for this service.  Please check with your medical insurance if this type of visit is covered. You may be responsible for the cost of this type of visit if insurance coverage is denied.  The typical cost is $30 (10min), $59 (11-20min) and $85 (21-30min).  Most often these visits are shorter than 10 minutes.    If during the course of the call the physician/provider feels a telephone visit is not appropriate, you will not be charged for this service.\"       Consent has been obtained for this service by 2 care team members: yes. See the scanned image in the medical record.    Jose Luis Gonzáles complains of  Recheck Medication      I have reviewed and updated the patient's Past Medical History, Social History, Family History and Medication List.    ALLERGIES  Clonidine; Contrast dye; Ibuprofen; Seroquel [quetiapine]; and Valproic acid    CAW (MA signature)    Additional provider notes:   Spoke with patient via phone in follow-up of chronic neck and back pain.  Generally doing well on his current medication.  No side effects and desires no change.  Has an upcoming snowmobile trip and would like to have his paper refills available prior to that.  We also tried some injectable testosterone for documented hypogonadism.  But this made him quite emotional and he broke out in acne.  " Says he is fine without it and just does not want to take any supplementation at this point.    ASSESSMENT / PLAN:  (G89.4) Chronic pain syndrome  (primary encounter diagnosis)  Comment: Stable on current dosage.  Refilled  Plan: oxyCODONE IR (ROXICODONE) 30 MG tablet,         PERCOCET  MG per tablet            (M54.2) Neck pain  Comment: As above  Plan: oxyCODONE IR (ROXICODONE) 30 MG tablet,         PERCOCET  MG per tablet            (E29.1) Male hypogonadism  Comment: We will hold off on any supplementation for now and continue to follow symptoms -consideration for lower dose topical  Plan:        I have reviewed the note as documented above.  This accurately captures the substance of my conversation with the patient,  SJeremi Cui M.D.     Total time of call between patient and provider was 6 minutes      3

## 2021-11-26 NOTE — ED PROVIDER NOTE - CLINICAL SUMMARY MEDICAL DECISION MAKING FREE TEXT BOX
46 yr old male with hx of smoking, HTN, HLD and on statin presents to ed c/o myalgia x 2 weeks. pt had fever and uri sx but resolved. no sob, no cp, no dysuria, no rashes, no joint pain, no abd pain, no n/v. denies any concern for sti.    myalgia possibly post viral syndrome r/o rhabdo vs pna vs anemia? labs, cxr, hiv re-assess likely dc with outpt pcp follow up

## 2021-11-28 ENCOUNTER — EMERGENCY (EMERGENCY)
Facility: HOSPITAL | Age: 46
LOS: 1 days | Discharge: HOME | End: 2021-11-28
Attending: EMERGENCY MEDICINE
Payer: MEDICAID

## 2021-11-28 VITALS
HEIGHT: 68 IN | OXYGEN SATURATION: 94 % | RESPIRATION RATE: 18 BRPM | HEART RATE: 68 BPM | DIASTOLIC BLOOD PRESSURE: 85 MMHG | WEIGHT: 190.04 LBS | TEMPERATURE: 98 F | SYSTOLIC BLOOD PRESSURE: 134 MMHG

## 2021-11-28 VITALS
RESPIRATION RATE: 18 BRPM | SYSTOLIC BLOOD PRESSURE: 139 MMHG | TEMPERATURE: 98 F | OXYGEN SATURATION: 99 % | HEART RATE: 67 BPM | DIASTOLIC BLOOD PRESSURE: 83 MMHG

## 2021-11-28 LAB
ALBUMIN SERPL ELPH-MCNC: 3.3 G/DL — LOW (ref 3.5–5)
ALP SERPL-CCNC: 70 U/L — SIGNIFICANT CHANGE UP (ref 40–120)
ALT FLD-CCNC: 86 U/L DA — HIGH (ref 10–60)
ANION GAP SERPL CALC-SCNC: 4 MMOL/L — LOW (ref 5–17)
APPEARANCE UR: CLEAR — SIGNIFICANT CHANGE UP
APTT BLD: 31.4 SEC — SIGNIFICANT CHANGE UP (ref 27.5–35.5)
AST SERPL-CCNC: 47 U/L — HIGH (ref 10–40)
BASOPHILS # BLD AUTO: 0.02 K/UL — SIGNIFICANT CHANGE UP (ref 0–0.2)
BASOPHILS NFR BLD AUTO: 0.3 % — SIGNIFICANT CHANGE UP (ref 0–2)
BILIRUB SERPL-MCNC: 0.7 MG/DL — SIGNIFICANT CHANGE UP (ref 0.2–1.2)
BILIRUB UR-MCNC: NEGATIVE — SIGNIFICANT CHANGE UP
BUN SERPL-MCNC: 8 MG/DL — SIGNIFICANT CHANGE UP (ref 7–18)
CALCIUM SERPL-MCNC: 9.3 MG/DL — SIGNIFICANT CHANGE UP (ref 8.4–10.5)
CHLORIDE SERPL-SCNC: 104 MMOL/L — SIGNIFICANT CHANGE UP (ref 96–108)
CO2 SERPL-SCNC: 30 MMOL/L — SIGNIFICANT CHANGE UP (ref 22–31)
COLOR SPEC: YELLOW — SIGNIFICANT CHANGE UP
CREAT SERPL-MCNC: 0.78 MG/DL — SIGNIFICANT CHANGE UP (ref 0.5–1.3)
DIFF PNL FLD: NEGATIVE — SIGNIFICANT CHANGE UP
EOSINOPHIL # BLD AUTO: 0.11 K/UL — SIGNIFICANT CHANGE UP (ref 0–0.5)
EOSINOPHIL NFR BLD AUTO: 1.5 % — SIGNIFICANT CHANGE UP (ref 0–6)
GLUCOSE SERPL-MCNC: 101 MG/DL — HIGH (ref 70–99)
GLUCOSE UR QL: NEGATIVE — SIGNIFICANT CHANGE UP
HCT VFR BLD CALC: 39.6 % — SIGNIFICANT CHANGE UP (ref 39–50)
HGB BLD-MCNC: 13.8 G/DL — SIGNIFICANT CHANGE UP (ref 13–17)
IMM GRANULOCYTES NFR BLD AUTO: 0.3 % — SIGNIFICANT CHANGE UP (ref 0–1.5)
INR BLD: 1.05 RATIO — SIGNIFICANT CHANGE UP (ref 0.88–1.16)
KETONES UR-MCNC: NEGATIVE — SIGNIFICANT CHANGE UP
LACTATE SERPL-SCNC: 1 MMOL/L — SIGNIFICANT CHANGE UP (ref 0.7–2)
LEUKOCYTE ESTERASE UR-ACNC: NEGATIVE — SIGNIFICANT CHANGE UP
LYMPHOCYTES # BLD AUTO: 2.04 K/UL — SIGNIFICANT CHANGE UP (ref 1–3.3)
LYMPHOCYTES # BLD AUTO: 28.4 % — SIGNIFICANT CHANGE UP (ref 13–44)
MCHC RBC-ENTMCNC: 33.3 PG — SIGNIFICANT CHANGE UP (ref 27–34)
MCHC RBC-ENTMCNC: 34.8 GM/DL — SIGNIFICANT CHANGE UP (ref 32–36)
MCV RBC AUTO: 95.7 FL — SIGNIFICANT CHANGE UP (ref 80–100)
MONOCYTES # BLD AUTO: 0.9 K/UL — SIGNIFICANT CHANGE UP (ref 0–0.9)
MONOCYTES NFR BLD AUTO: 12.5 % — SIGNIFICANT CHANGE UP (ref 2–14)
NEUTROPHILS # BLD AUTO: 4.1 K/UL — SIGNIFICANT CHANGE UP (ref 1.8–7.4)
NEUTROPHILS NFR BLD AUTO: 57 % — SIGNIFICANT CHANGE UP (ref 43–77)
NITRITE UR-MCNC: NEGATIVE — SIGNIFICANT CHANGE UP
NRBC # BLD: 0 /100 WBCS — SIGNIFICANT CHANGE UP (ref 0–0)
PH UR: 6.5 — SIGNIFICANT CHANGE UP (ref 5–8)
PLATELET # BLD AUTO: 225 K/UL — SIGNIFICANT CHANGE UP (ref 150–400)
POTASSIUM SERPL-MCNC: 4.1 MMOL/L — SIGNIFICANT CHANGE UP (ref 3.5–5.3)
POTASSIUM SERPL-SCNC: 4.1 MMOL/L — SIGNIFICANT CHANGE UP (ref 3.5–5.3)
PROT SERPL-MCNC: 7.3 G/DL — SIGNIFICANT CHANGE UP (ref 6–8.3)
PROT UR-MCNC: NEGATIVE — SIGNIFICANT CHANGE UP
PROTHROM AB SERPL-ACNC: 12.5 SEC — SIGNIFICANT CHANGE UP (ref 10.6–13.6)
RAPID RVP RESULT: DETECTED
RBC # BLD: 4.14 M/UL — LOW (ref 4.2–5.8)
RBC # FLD: 11.2 % — SIGNIFICANT CHANGE UP (ref 10.3–14.5)
SARS-COV-2 RNA SPEC QL NAA+PROBE: DETECTED
SODIUM SERPL-SCNC: 138 MMOL/L — SIGNIFICANT CHANGE UP (ref 135–145)
SP GR SPEC: 1.01 — SIGNIFICANT CHANGE UP (ref 1.01–1.02)
UROBILINOGEN FLD QL: NEGATIVE — SIGNIFICANT CHANGE UP
WBC # BLD: 7.19 K/UL — SIGNIFICANT CHANGE UP (ref 3.8–10.5)
WBC # FLD AUTO: 7.19 K/UL — SIGNIFICANT CHANGE UP (ref 3.8–10.5)

## 2021-11-28 PROCEDURE — 96374 THER/PROPH/DIAG INJ IV PUSH: CPT

## 2021-11-28 PROCEDURE — 85610 PROTHROMBIN TIME: CPT

## 2021-11-28 PROCEDURE — 87040 BLOOD CULTURE FOR BACTERIA: CPT

## 2021-11-28 PROCEDURE — 87086 URINE CULTURE/COLONY COUNT: CPT

## 2021-11-28 PROCEDURE — 36415 COLL VENOUS BLD VENIPUNCTURE: CPT

## 2021-11-28 PROCEDURE — 85025 COMPLETE CBC W/AUTO DIFF WBC: CPT

## 2021-11-28 PROCEDURE — 81003 URINALYSIS AUTO W/O SCOPE: CPT

## 2021-11-28 PROCEDURE — 0225U NFCT DS DNA&RNA 21 SARSCOV2: CPT

## 2021-11-28 PROCEDURE — 94640 AIRWAY INHALATION TREATMENT: CPT

## 2021-11-28 PROCEDURE — 83605 ASSAY OF LACTIC ACID: CPT

## 2021-11-28 PROCEDURE — 85730 THROMBOPLASTIN TIME PARTIAL: CPT

## 2021-11-28 PROCEDURE — 80053 COMPREHEN METABOLIC PANEL: CPT

## 2021-11-28 PROCEDURE — 99285 EMERGENCY DEPT VISIT HI MDM: CPT | Mod: 25

## 2021-11-28 PROCEDURE — 99285 EMERGENCY DEPT VISIT HI MDM: CPT

## 2021-11-28 PROCEDURE — 93005 ELECTROCARDIOGRAM TRACING: CPT

## 2021-11-28 RX ORDER — SODIUM CHLORIDE 9 MG/ML
2700 INJECTION INTRAMUSCULAR; INTRAVENOUS; SUBCUTANEOUS ONCE
Refills: 0 | Status: COMPLETED | OUTPATIENT
Start: 2021-11-28 | End: 2021-11-28

## 2021-11-28 RX ORDER — ALBUTEROL 90 UG/1
2 AEROSOL, METERED ORAL ONCE
Refills: 0 | Status: COMPLETED | OUTPATIENT
Start: 2021-11-28 | End: 2021-11-28

## 2021-11-28 RX ORDER — IPRATROPIUM/ALBUTEROL SULFATE 18-103MCG
3 AEROSOL WITH ADAPTER (GRAM) INHALATION
Refills: 0 | Status: COMPLETED | OUTPATIENT
Start: 2021-11-28 | End: 2021-11-28

## 2021-11-28 RX ADMIN — Medication 3 MILLILITER(S): at 14:24

## 2021-11-28 RX ADMIN — Medication 125 MILLIGRAM(S): at 14:05

## 2021-11-28 RX ADMIN — ALBUTEROL 2 PUFF(S): 90 AEROSOL, METERED ORAL at 17:09

## 2021-11-28 RX ADMIN — SODIUM CHLORIDE 2700 MILLILITER(S): 9 INJECTION INTRAMUSCULAR; INTRAVENOUS; SUBCUTANEOUS at 12:07

## 2021-11-28 RX ADMIN — Medication 3 MILLILITER(S): at 13:06

## 2021-11-28 RX ADMIN — Medication 3 MILLILITER(S): at 15:06

## 2021-11-28 RX ADMIN — Medication 3 MILLILITER(S): at 15:00

## 2021-11-28 NOTE — ED ADULT TRIAGE NOTE - CHIEF COMPLAINT QUOTE
sent by pmd to R/O Covid , R/O Sepsis pt c/o chills , body aches , subjective fevers . seen here last Friday for same

## 2021-11-28 NOTE — ED PROVIDER NOTE - NSFOLLOWUPINSTRUCTIONS_ED_ALL_ED_FT
COVID-19 (Coronavirus Disease 2019)    WHAT YOU NEED TO KNOW:    COVID-19 is the disease caused by a coronavirus first discovered in December 2019. Coronaviruses generally cause upper respiratory (nose, throat, and lung) infections, such as a cold. The 2019 virus spreads quickly and easily. It can be spread starting 2 to 3 days before symptoms even begin.    DISCHARGE INSTRUCTIONS:    Call your local emergency number (911 in the ) if:   •You have trouble breathing or shortness of breath at rest.      •You have chest pain or pressure that lasts longer than 5 minutes.      •You become confused or hard to wake.      •Your lips or face are blue.      Seek care immediately if:   •You have a fever of 104°F (40°C) or higher.          Call your doctor if:   •You have symptoms of COVID-19.      •You have questions or concerns about your condition or care.      Medicines: You may need any of the following:   •Decongestants help reduce nasal congestion and help you breathe more easily. If you take decongestant pills, they may make you feel restless or cause problems with your sleep. Do not use decongestant sprays for more than a few days.      •Cough suppressants help reduce coughing. Ask your healthcare provider which type of cough medicine is best for you.      •Acetaminophen decreases pain and fever. It is available without a doctor's order. Ask how much to take and how often to take it. Follow directions. Read the labels of all other medicines you are using to see if they also contain acetaminophen, or ask your doctor or pharmacist. Acetaminophen can cause liver damage if not taken correctly. Do not use more than 4 grams (4,000 milligrams) total of acetaminophen in one day.       •NSAIDs, such as ibuprofen, help decrease swelling, pain, and fever. This medicine is available with or without a doctor's order. NSAIDs can cause stomach bleeding or kidney problems in certain people. If you take blood thinner medicine, always ask your healthcare provider if NSAIDs are safe for you. Always read the medicine label and follow directions.      •Blood thinners help prevent blood clots. Clots can cause strokes, heart attacks, and death. The following are general safety guidelines to follow while you are taking a blood thinner:?Watch for bleeding and bruising while you take blood thinners. Watch for bleeding from your gums or nose. Watch for blood in your urine and bowel movements. Use a soft washcloth on your skin, and a soft toothbrush to brush your teeth. This can keep your skin and gums from bleeding. If you shave, use an electric shaver. Do not play contact sports.       ?Tell your dentist and other healthcare providers that you take a blood thinner. Wear a bracelet or necklace that says you take this medicine.       ?Do not start or stop any other medicines unless your healthcare provider tells you to. Many medicines cannot be used with blood thinners.      ?Take your blood thinner exactly as prescribed by your healthcare provider. Do not skip does or take less than prescribed. Tell your provider right away if you forget to take your blood thinner, or if you take too much.      ?Warfarin is a blood thinner that you may need to take. The following are things you should be aware of if you take warfarin: ?Foods and medicines can affect the amount of warfarin in your blood. Do not make major changes to your diet while you take warfarin. Warfarin works best when you eat about the same amount of vitamin K every day. Vitamin K is found in green leafy vegetables and certain other foods. Ask for more information about what to eat when you are taking warfarin.      ?You will need to see your healthcare provider for follow-up visits when you are on warfarin. You will need regular blood tests. These tests are used to decide how much medicine you need.         •Take your medicine as directed. Contact your healthcare provider if you think your medicine is not helping or if you have side effects. Tell him or her if you are allergic to any medicine. Keep a list of the medicines, vitamins, and herbs you take. Include the amounts, and when and why you take them. Bring the list or the pill bottles to follow-up visits. Carry your medicine list with you in case of an emergency.      What you need to know about variants: The virus has changed into several new forms (called variants) since it was discovered. The variants may be more contagious (easily spread) than the original form. Some may also cause more severe illness than others.    What you need to know about COVID-19 vaccines: Healthcare providers recommend a COVID-19 vaccine, even if you have already had COVID-19. You are considered fully vaccinated against COVID-19 two weeks after the final dose of any COVID-19 vaccine. Let your healthcare provider know when you have received the final dose of the vaccine. Make a copy of your vaccination card. Keep the original with you in case you need to show it. Keep the copy in a safe place.  •COVID-19 vaccines are given as a shot in 1 or 2 doses. The vaccine is recommended for everyone 12 years or older.      •A third dose is recommended for adults with a weakened immune system who get a 2-dose vaccine. The third dose is given at least 28 days after the second.      •A booster (additional) dose is being recommended for other people as well. This is usually given 6 months after the initial doses are complete. Continue social distancing and other measures, even after you get the vaccine. Although it is not common, you can become infected after you get the vaccine. You may also be able to pass the virus to others without knowing you are infected.      •After you get the vaccine, check local, national, and international travel rules. You may need to be tested before you travel. Some countries require proof of a negative test before you travel. You may also need to quarantine after you return.      How the 2019 coronavirus spreads:   •Droplets are the main way all coronaviruses spread. The virus travels in droplets that form when a person talks, sings, coughs, or sneezes. The droplets can also float in the air for minutes or hours. Infection happens when you breathe in the droplets or get them in your eyes or nose. Close personal contact with an infected person increases your risk for infection. This means being within 6 feet (2 meters) of the person for at least 15 minutes over 24 hours.      •Person-to-person contact can spread the virus. For example, a person with the virus on his or her hands can spread it by shaking hands with someone.      •The virus can stay on objects and surfaces for up to 3 days. You may become infected by touching the object or surface and then touching your eyes or mouth.      Help lower the risk for COVID-19:   •Wash your hands often throughout the day. Use soap and water. Rub your soapy hands together, lacing your fingers, for at least 20 seconds. Rinse with warm, running water. Dry your hands with a clean towel or paper towel. Use hand  that contains alcohol if soap and water are not available. Teach children how to wash their hands and use hand .  Handwashing           •Cover sneezes and coughs. Turn your face away and cover your mouth and nose with a tissue. Throw the tissue away. Use the bend of your arm if a tissue is not available. Then wash your hands well with soap and water or use hand . Teach children how to cover a cough or sneeze.      •Wear a face covering (mask) when needed. Use a cloth covering with at least 2 layers. You can also create layers by putting a cloth covering over a disposable non-medical mask. Cover your mouth and your nose.  How to Wear a Face Covering (Mask)           •Follow worldwide, national, and local social distancing guidelines. Keep at least 6 feet (2 meters) between you and others.      •Try not to touch your face. If you get the virus on your hands, you can transfer it to your eyes, nose, or mouth and become infected. You can also transfer it to objects, surfaces, or people.      •Clean and disinfect high-touch surfaces and objects often. Use disinfecting wipes, or make a solution of 4 teaspoons of bleach in 1 quart (4 cups) of water.      •Ask about other vaccines you may need. Get the influenza (flu) vaccine as soon as recommended each year, usually starting in September or October. Get the pneumonia vaccine if recommended. Your healthcare provider can tell you if you should also get other vaccines, and when to get them.    Prevent COVID-19 Infection         Follow social distancing guidelines: National and local social distancing rules vary. Rules and restrictions may change over time as restrictions are lifted. The following are general guidelines:   •Stay home if you are sick or think you may have COVID-19. It is important to stay home if you are waiting for a testing appointment or for test results.      •Avoid close physical contact with anyone who does not live in your home. Do not shake hands with, hug, or kiss a person as a greeting. If you must use public transportation (such as a bus or subway), try to sit or stand away from others. Wear your face covering.      •Avoid in-person gatherings and crowds. Attend virtually if possible.      For more information:   •Centers for Disease Control and Prevention  [address] [place] [address]  Phone: 1-513.276.6536  Web Address: http://www.cdc.gov        Follow up with your doctor as directed: Write down your questions so you remember to ask them during your visits.

## 2021-11-28 NOTE — ED PROVIDER NOTE - PATIENT PORTAL LINK FT
You can access the FollowMyHealth Patient Portal offered by [facility] by registering at the following website: [email or web address]. By joining [employer]’s FollowMyHealth portal, you will also be able to view your health information using other applications (apps) compatible with our system.

## 2021-11-28 NOTE — ED PROVIDER NOTE - CLINICAL SUMMARY MEDICAL DECISION MAKING FREE TEXT BOX
45 yo male with fever, cough for past 2 weeks. Currently afebrile in NAD. Will perform labs, COVID testing trial of albuterol, steroids and reassess

## 2021-11-28 NOTE — ED PROVIDER NOTE - OBJECTIVE STATEMENT
47 yo male no PMh p/w 2 weeks of fever, chills, cough and body aches. Pt was seen 2 days ago was treat and released. Pt saw PMD today and sent for further eval. Already completed course of Augmentin per wife. Denies any cp, sob, dizziness leg pain or swelling. Pt denies any travel or sick contacts. Per paperwork pt was treated with cipro for prostatitis earlier this month. Pt with no  symptoms.

## 2021-11-29 LAB
CULTURE RESULTS: NO GROWTH — SIGNIFICANT CHANGE UP
SPECIMEN SOURCE: SIGNIFICANT CHANGE UP

## 2021-12-03 LAB
CULTURE RESULTS: SIGNIFICANT CHANGE UP
CULTURE RESULTS: SIGNIFICANT CHANGE UP
SPECIMEN SOURCE: SIGNIFICANT CHANGE UP
SPECIMEN SOURCE: SIGNIFICANT CHANGE UP

## 2022-10-30 NOTE — ED PROVIDER NOTE - HIV OFFER
Use eyedrops or ointment in affected eyes as prescribed  Clean everything thoroughly  Washcloth to the area  Follow-up with Optometrist/Pediatrician in the next 1-2 days for further evaluation and treatment  Go to the ED if any fevers, unable to stay hydrated, change in vision, eye pain, pain with eye movement, pain swelling redness around the eye, headache, facial swelling, abdominal pain, chest pain, shortness of breath, new or worsening symptoms or other concerning symptoms  Conjunctivitis   WHAT YOU NEED TO KNOW:   Conjunctivitis, or pink eye, is inflammation of your conjunctiva  The conjunctiva is a thin tissue that covers the front of your eye and the back of your eyelids  The conjunctiva helps protect your eye and keep it moist  Conjunctivitis may be caused by bacteria, allergies, or a virus  If your conjunctivitis is caused by bacteria, it may get better on its own in about 7 days  Viral conjunctivitis can last up to 3 weeks  DISCHARGE INSTRUCTIONS:   Return to the emergency department if:   You have worsening eye pain  The swelling in your eye gets worse, even after treatment  Your vision suddenly becomes worse or you cannot see at all  Contact your healthcare provider if:   You develop a fever and ear pain  You have tiny bumps or spots of blood on your eye  You have questions or concerns about your condition or care  Manage your symptoms:   Apply a cool compress  Wet a washcloth with cold water and place it on your eye  This will help decrease itching and irritation  Do not wear contact lenses  They can irritate your eye  Throw away the pair you are using and ask when you can wear them again  Use a new pair of lenses when your healthcare provider says it is okay  Avoid irritants  Stay away from smoke filled areas  Shield your eyes from wind and sun  Flush your eye  You may need to flush your eye with saline to help decrease your symptoms   Ask for more information on how to flush your eye  Medicines:  Treatment depends on what is causing your conjunctivitis  You may be given any of the following: Allergy medicine  helps decrease itchy, red, swollen eyes caused by allergies  It may be given as a pill, eye drops, or nasal spray  Antibiotics  may be needed if your conjunctivitis is caused by bacteria  This medicine may be given as a pill, eye drops, or eye ointment  Take your medicine as directed  Contact your healthcare provider if you think your medicine is not helping or if you have side effects  Tell him or her if you are allergic to any medicine  Keep a list of the medicines, vitamins, and herbs you take  Include the amounts, and when and why you take them  Bring the list or the pill bottles to follow-up visits  Carry your medicine list with you in case of an emergency  Prevent the spread of conjunctivitis:   Wash your hands with soap and water often  Wash your hands before and after you touch your eyes  Also wash your hands before you prepare or eat food and after you use the bathroom or change a diaper  Avoid allergens  Try to avoid the things that cause your allergies, such as pets, dust, or grass  Avoid contact with others  Do not share towels or washcloths  Try to stay away from others as much as possible  Ask when you can return to work or school  Throw away eye makeup  The bacteria that caused your conjunctivitis can stay in eye makeup  Throw away mascara and other eye makeup  © Copyright [employer] 2022 Information is for End User's use only and may not be sold, redistributed or otherwise used for commercial purposes  All illustrations and images included in CareNotes® are the copyrighted property of [employer]  or Debra Lynch  The above information is an  only  It is not intended as medical advice for individual conditions or treatments   Talk to your doctor, nurse or pharmacist before following any medical regimen to see if it is safe and effective for you  Previously Negative (within the last year)

## 2023-01-19 NOTE — ED ADULT TRIAGE NOTE - SOURCE OF INFORMATION
1728-Call back received from [facility] with Bed assignment at [facility]. Patient is going to Room 201 Bed 2, [phone], [facility] will call back with transport. 1736-Call back received from [facility] with transport booked with [facility] of 1 hour to 75 minutes.       John Davila  01/19/23 1740 Patient

## 2024-01-05 NOTE — PATIENT PROFILE ADULT. - HAS THE PATIENT HAD A SIGNIFICANT CHANGE IN FUNCTIONAL STATUS DUE TO CVA, HEAD TRAUMA, ORTHOPEDIC TRAUMA/SURGERY, OR FALL, WITH THE WEEK PRIOR TO ADMISSION
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 He should remain on PPI daily and get follow up with GI for EGD in one year! no

## 2024-02-14 NOTE — ED PROVIDER NOTE - RESPIRATORY NEGATIVE STATEMENT, MLM
Hide Additional Notes?: No Additional Note: Reassured benign in nature. Detail Level: Zone no chest pain, no cough, and no shortness of breath. Detail Level: Detailed Quality 137: Melanoma: Continuity Of Care - Recall System: Patient information entered into a recall system that includes: target date for the next exam specified AND a process to follow up with patients regarding missed or unscheduled appointments When Should The Patient Follow-Up For Their Next Full-Body Skin Exam?: 6 Months

## 2024-12-03 NOTE — PATIENT PROFILE ADULT. - ALCOHOL USE HISTORY SINGLE SELECT
Progress Note  Family Medicine Residency  [facility]       Subjective:  Patient seen at bedside this morning.  Patient reports that he overall feels well and is ready for his surgery today. Denies chest pain, shortness of breath, abdominal pain or pain with urination.    Hospital Meds  Current Facility-Administered Medications   Medication Dose Route Frequency Provider Last Rate Last Admin    labetalol (NORMODYNE) injection 10 mg  10 mg Intravenous Q10 Min PRN Ravi Cast MD        hydrALAZINE (APRESOLINE) injection 10 mg  10 mg Intravenous Q4H PRN Ravi Cast MD        [Held by provider] niCARdipine (CARDENE) 40 mg/200 mL in NaCl infusion  0-15 mg/hr Intravenous Continuous Ravi Cast MD        sodium chloride 0.9 % injection 10 mL  10 mL Intravenous PRN Ravi Cast MD        sodium chloride 0.9 % injection 2 mL  2 mL Intracatheter 2 times per day Ravi Cast MD        acetaminophen (TYLENOL) tablet 650 mg  650 mg Oral Q4H PRN Ravi Cast MD        Or    acetaminophen (TYLENOL) suppository 650 mg  650 mg Rectal Q4H PRN Ravi Cast MD        HYDROcodone-acetaminophen (NORCO) 5-325 MG per tablet 1 tablet  1 tablet Oral Q4H PRN Ravi Cast MD        [START ON 12/4/2024] clopidogrel (PLAVIX) tablet 75 mg  75 mg Oral Daily Ravi Cast MD        PHENYLephrine (AIXA-SYNEPHRINE) 50 mg/250 mL sodium chloride 0.9 % infusion  0-200 mcg/min Intravenous Continuous Antoni, Angeles, DO 60 mL/hr at 12/03/24 1759 200 mcg/min at 12/03/24 1759    polyethylene glycol (MIRALAX) packet 17 g  17 g Oral Daily Antoni, Angeles, DO        sodium chloride 0.9% infusion   Intravenous Continuous Antoni, Angeles, DO 50 mL/hr at 12/03/24 1759 Rate Verify at 12/03/24 1759    dextrose 50 % injection 25 g  25 g Intravenous Once Nagi Reardon, DO        Followed by    insulin regular human (HumuLIN R) (diluted) 1 unit/mL injection 5 Units  5 Units  Intravenous Once Nagi Reardon DO        aspirin (ECOTRIN) enteric coated tablet 81 mg  81 mg Oral Daily MaggieAysha brenner CNS   81 mg at 12/03/24 0802    sodium chloride (NORMAL SALINE) 0.9 % bolus 100-200 mL  100-200 mL Intravenous PRN Bertin Estrada MD        [Held by provider] sevelamer carbonate (RENVELA) oral packet 1,600 mg  1,600 mg Oral BID Mike Serrano MD        [Held by provider] sevelamer carbonate (RENVELA) oral packet 800 mg  800 mg Oral Daily Mike Serrano MD        sodium chloride 0.9 % injection 10 mL  10 mL Intravenous PRN Ravi Cast MD        sodium chloride 0.9 % injection 2 mL  2 mL Intracatheter 2 times per day Ravi Cast MD   2 mL at 12/03/24 0801    sodium chloride (NORMAL SALINE) 0.9 % bolus 500 mL  500 mL Intravenous PRN Ravi Cast MD        [Held by provider] rivaroxaban (XARELTO) tablet 15 mg  15 mg Oral Daily with breakfast Brandt Fox DO   15 mg at 11/29/24 0905    atorvastatin (LIPITOR) tablet 80 mg  80 mg Oral Nightly Ravi Cast MD   80 mg at 12/02/24 2102    [Held by provider] carvedilol (COREG) tablet 6.25 mg  6.25 mg Oral BID Ravi Cast MD        cinacalcet (SENSIPAR) tablet 60 mg  60 mg Oral Daily Ravi Cast MD   60 mg at 12/03/24 0802    [Held by provider] lisinopril (ZESTRIL) tablet 10 mg  10 mg Oral Daily Fatou Butts DO        mirtazapine (REMERON) tablet 7.5 mg  7.5 mg Oral Nightly Ravi Cast MD   7.5 mg at 12/02/24 2102    sodium chloride 0.9 % injection 10 mL  10 mL Injection PRN Maria Bean DO        [Held by provider] sevelamer carbonate (RENVELA) tablet 1,600 mg  1,600 mg Oral BID  Fatou Butts DO   1,600 mg at 11/27/24 0859    [Held by provider] sevelamer carbonate (RENVELA) tablet 800 mg  800 mg Oral Daily with lunch Fatou Butts DO   800 mg at 11/27/24 1309    sodium chloride (NORMAL SALINE) 0.9 % bolus 100-200 mL  100-200 mL Intravenous PRN Bertin Estrada MD               Objective:    I/O's    Intake/Output Summary (Last 24 hours) at 12/3/2024 1957  Last data filed at 12/3/2024 1759  Gross per 24 hour   Intake 982.88 ml   Output --   Net 982.88 ml           Vitals  Visit Vitals  BP 91/55 (BP Location: RUE - Right upper extremity)   Pulse 92   Temp 98.2 °F (36.8 °C) (Oral)   Resp (!) 0   Ht 5' 11\" (1.803 m)   Wt 72.4 kg (159 lb 9.8 oz)   SpO2 98%   BMI 22.26 kg/m²       Physical Exam   Physical Exam  Vitals reviewed.   Constitutional:       General: He is not in acute distress.     Appearance: He is normal weight.      Comments: Patient awake and oriented.    HENT:      Head: Normocephalic and atraumatic.      Right Ear: External ear normal.      Left Ear: External ear normal.      Nose: Nose normal.      Mouth/Throat:      Mouth: Mucous membranes are moist.      Pharynx: Oropharynx is clear.      Neck: Normal range of motion and neck supple.   Eyes:      General: Gaze aligned appropriately. Visual field deficit present.         Right eye: No discharge.         Left eye: No discharge.      Extraocular Movements: Extraocular movements intact.      Right eye: Normal extraocular motion.      Left eye: Normal extraocular motion.      Conjunctiva/sclera: Conjunctivae normal.      Comments: Both eyes have normal EOM.  Non Reactive to light for right eye.   Left eye normal reactivity to light   Cardiovascular:      Rate and Rhythm: Normal rate and regular rhythm.      Pulses: Normal pulses.      Heart sounds: Normal heart sounds.   Pulmonary:      Effort: Pulmonary effort is normal.      Breath sounds: Normal breath sounds.   Abdominal:      General: Bowel sounds are normal. There is no distension.      Palpations: Abdomen is soft.      Tenderness: There is no abdominal tenderness.   Musculoskeletal:         General: Normal range of motion.      Comments: Bilateral AKA- stumps are intact, clean, no bruising or skin breakage   Skin:     General: Skin is warm and dry.       Coloration: Skin is not jaundiced.      Findings: No bruising, erythema or rash.      Comments: Right permacath in place, area is clean/dry/intact.   Neurological:      Mental Status: He is alert.   Psychiatric:         Mood and Affect: Mood normal.         Thought Content: Thought content normal.         Labs     Recent Results (from the past 24 hour(s))   Comprehensive Metabolic Panel    Collection Time: 12/03/24  6:14 AM    Specimen: Blood, Venous   Result Value Ref Range    Fasting Status      Sodium 139 135 - 145 mmol/L    Potassium 3.8 3.4 - 5.1 mmol/L    Chloride 106 97 - 110 mmol/L    Carbon Dioxide 27 21 - 32 mmol/L    Anion Gap 10 7 - 19 mmol/L    Glucose 76 70 - 99 mg/dL    BUN 10 6 - 20 mg/dL    Creatinine 2.81 (H) 0.67 - 1.17 mg/dL    Glomerular Filtration Rate 22 (L) >=60    BUN/Cr 4 (L) 7 - 25    Calcium 7.7 (L) 8.4 - 10.2 mg/dL    Bilirubin, Total 0.6 0.2 - 1.0 mg/dL    GOT/AST 38 (H) <=37 Units/L    GPT/ALT 22 <64 Units/L    Alkaline Phosphatase 93 45 - 117 Units/L    Albumin 2.2 (L) 3.4 - 5.0 g/dL    Protein, Total 5.5 (L) 6.4 - 8.2 g/dL    Globulin 3.3 2.0 - 4.0 g/dL    A/G Ratio 0.7 (L) 1.0 - 2.4   Platelet P2Y12    Collection Time: 12/03/24  6:14 AM    Specimen: Blood, Venous   Result Value Ref Range    Platelet P2Y12 168 (L) 194 - 418 PRU   CBC with Automated Differential (performable only)    Collection Time: 12/03/24  6:14 AM    Specimen: Blood, Venous   Result Value Ref Range    WBC 6.4 4.2 - 11.0 K/mcL    RBC 2.25 (L) 4.50 - 5.90 mil/mcL    HGB 8.5 (L) 13.0 - 17.0 g/dL    HCT 25.7 (L) 39.0 - 51.0 %    .2 (H) 78.0 - 100.0 fl    MCH 37.8 (H) 26.0 - 34.0 pg    MCHC 33.1 32.0 - 36.5 g/dL    RDW-CV 15.9 (H) 11.0 - 15.0 %    RDW-SD 65.9 (H) 39.0 - 50.0 fL     (L) 140 - 450 K/mcL    NRBC 0 <=0 /100 WBC    Neutrophil, Percent 57 %    Lymphocytes, Percent 28 %    Mono, Percent 10 %    Eosinophils, Percent 3 %    Basophils, Percent 1 %    Immature Granulocytes 1 %    Absolute  Neutrophils 3.6 1.8 - 7.7 K/mcL    Absolute Lymphocytes 1.8 1.0 - 4.0 K/mcL    Absolute Monocytes 0.7 0.3 - 0.9 K/mcL    Absolute Eosinophils  0.2 0.0 - 0.5 K/mcL    Absolute Basophils 0.1 0.0 - 0.3 K/mcL    Absolute Immature Granulocytes 0.0 0.0 - 0.2 K/mcL   Prothrombin Time (INR/PT)    Collection Time: 12/03/24  6:14 AM    Specimen: Blood, Venous   Result Value Ref Range    Protime- PT 10.9 9.7 - 11.8 sec    INR 1.0     Partial Thromboplastin Time (PTT)    Collection Time: 12/03/24  6:14 AM    Specimen: Blood, Venous   Result Value Ref Range    PTT 31 22 - 32 sec   GLUCOSE, BEDSIDE - POINT OF CARE    Collection Time: 12/03/24  2:35 PM    Specimen: Blood   Result Value Ref Range    GLUCOSE, BEDSIDE - POINT OF CARE 75 70 - 99 mg/dL   Basic Metabolic Panel    Collection Time: 12/03/24  4:04 PM    Specimen: Blood, Arterial   Result Value Ref Range    Fasting Status      Sodium 138 135 - 145 mmol/L    Potassium 5.8 (H) 3.4 - 5.1 mmol/L    Chloride 107 97 - 110 mmol/L    Carbon Dioxide 25 21 - 32 mmol/L    Anion Gap 12 7 - 19 mmol/L    Glucose 78 70 - 99 mg/dL    BUN 15 6 - 20 mg/dL    Creatinine 3.00 (H) 0.67 - 1.17 mg/dL    Glomerular Filtration Rate 20 (L) >=60    BUN/Cr 5 (L) 7 - 25    Calcium 7.4 (L) 8.4 - 10.2 mg/dL   CBC with Automated Differential (performable only)    Collection Time: 12/03/24  4:04 PM    Specimen: Blood, Arterial   Result Value Ref Range    WBC 8.0 4.2 - 11.0 K/mcL    RBC 2.16 (L) 4.50 - 5.90 mil/mcL    HGB 8.0 (L) 13.0 - 17.0 g/dL    HCT 25.5 (L) 39.0 - 51.0 %    .1 (H) 78.0 - 100.0 fl    MCH 37.0 (H) 26.0 - 34.0 pg    MCHC 31.4 (L) 32.0 - 36.5 g/dL    RDW-CV 16.1 (H) 11.0 - 15.0 %    RDW-SD 69.3 (H) 39.0 - 50.0 fL     (L) 140 - 450 K/mcL    NRBC 0 <=0 /100 WBC    Neutrophil, Percent 69 %    Lymphocytes, Percent 19 %    Mono, Percent 9 %    Eosinophils, Percent 1 %    Basophils, Percent 1 %    Immature Granulocytes 1 %    Absolute Neutrophils 5.5 1.8 - 7.7 K/mcL    Absolute  Lymphocytes 1.5 1.0 - 4.0 K/mcL    Absolute Monocytes 0.7 0.3 - 0.9 K/mcL    Absolute Eosinophils  0.1 0.0 - 0.5 K/mcL    Absolute Basophils 0.1 0.0 - 0.3 K/mcL    Absolute Immature Granulocytes 0.1 0.0 - 0.2 K/mcL   Electrocardiogram 12-Lead    Collection Time: 24  7:35 PM   Result Value Ref Range    Ventricular Rate EKG/Min (BPM) 96     Atrial Rate (BPM) 81     QRS-Interval (MSEC) 152     QT-Interval (MSEC) 440     QTc 556     R Axis (Degrees) -31     T Axis (Degrees) 149     REPORT TEXT       Atrial fibrillation  Left axis deviation  Left bundle branch block  Abnormal ECG  When compared with ECG of  2024 06:27,  Questionable change in  QRS axis  Nonspecific T wave abnormality now evident in  Inferior leads  T wave inversion less evident in  Lateral leads         Imaging  Stress Test with Myocardial Perfusion  *[facility]*  [address] [address] [phone]  Myocardial Perfusion Imaging    Regadenoson (Lexiscan)    Rest/Stress    Patient: John Wagoner     Study Date/Time:      2024 10:00AM  MRN:     [id number]             FIN#:                 [id number]  :     1944           Ht/Wt:  Age:     80                   BSA/BMI:  Gender:  M                    Baseline BP:  Ordering Physician:               Raul Weir MD  Referring Physician:              Raul Weir     Attending Physician:              Maria Bean  Diagnostic Physician:             Micky Fuentes MD     Nurse:                            Tammi Diallo     ------------------------------------------------------------------------------    ------------------------------------------------------------------------------  Indications:   Pre-op.    ------------------------------------------------------------------------------  Study Conclusions    Summary:    1. Myocardial perfusion imaging: Left ventricular size is normal. There is no     transient ischemic dilation of the left  ventricle during stress. The TID     ratio is 0.95. Prone imaging was not utilized. There is a medium-sized,     mild-moderate, fixed defect involving the basal and mid inferolateral     wall(s), consistent with infarction. Summed rest score 17 improves to     summed stress score 13 suggestive of no significant reversibility.  2. Gated SPECT: There is dyskinesis involving the ventricular septum, possibly     related to underlying conduction abnormality.  3. Stress: The calculated EF is 45%.  4. Stress ECG conclusions: The stress ECG is negative for ischemia.  5. Baseline ECG: Atrial fibrillation with right bundle branch block.  Impressions:    1. There is no evidence of myocardial ischemia.  2. There is evidence of myocardial infarction.    ------------------------------------------------------------------------------  Study data:   Nuclear components:    SPECT; rest/stress imaging.  Consent:  The risks, benefits, and alternatives to the procedure were explained to the  patient.  Study completion:  The patient tolerated the procedure well.    ------------------------------------------------------------------------------  Procedure data:  The patient arrived at the laboratory. A baseline ECG was  recorded. Intravenous access was obtained. Surface ECG leads and manual cuff  blood pressure measurements were monitored.  Regadenoson (Lexiscan) stress  test was performed. Regadenoson (Lexiscan) was administered by intravenous  bolus, followed by a 5ml saline flush. The total dose was 0.4mgover  10.00second(s).    ------------------------------------------------------------------------------  Isotope administration:    +-------------+----------------+----------------+  !Stage        !Rest            !Stress          !  +-------------+----------------+----------------+  !Agent        !Tc-99m sestamibi!Tc-99m sestamibi!  +-------------+----------------+----------------+  !Injected dose!10.8mCi         !32.9mCi          !  +-------------+----------------+----------------+  !Image method !Gated SPECT     !Gated SPECT     !  +-------------+----------------+----------------+  !Route        !IV              !IV              !  +-------------+----------------+----------------+    ------------------------------------------------------------------------------  Baseline ECG:   Atrial fibrillation with right bundle branch block.    ------------------------------------------------------------------------------  Cardiac stress table:    +----------------+------------+---+----------+---------------+---------------+  !Stage           !Time into   !HR !BP        !Symptoms       !Comments       !  !                !test        !   !          !               !               !  +----------------+------------+---+----------+---------------+---------------+  !PREINFSN SUPINE;!00:00       !92 !117/58    !---------------!No symptoms.   !  !9 min 35 sec    !            !   !(78)      !               !               !  +----------------+------------+---+----------+---------------+---------------+  !INFUSION        !09:36       !95 !----------!---------------!---------------!  !INFUSION; 1 min !            !   !          !               !               !  +----------------+------------+---+----------+---------------+---------------+  !POSTINFSN       !10:44       !96 !----------!---------------!---------------!  !RECOVERY1; 1 min!            !   !          !               !               !  +----------------+------------+---+----------+---------------+---------------+  !POSTINFSN       !11:36       !100!89/51 (64)!O2 sat 100% on !No symptoms.   !  !RECOVERY2; 1 min!            !   !          !room air.      !               !  +----------------+------------+---+----------+---------------+---------------+  !POSTINFSN       !12:44       !94 !89/51 (64)!---------------!No symptoms.   !  !RECOVERY3; 1 min!            !   !          !               !                !  +----------------+------------+---+----------+---------------+---------------+  !POSTINFSN       !13:44       !100!----------!---------------!---------------!  !RECOVERY4; 1 min!            !   !          !               !               !  +----------------+------------+---+----------+---------------+---------------+  !POSTINFSN       !14:44       !101!----------!---------------!I have a cramp !  !RECOVERY5; 1 min!            !   !          !               !in my stomach. !  +----------------+------------+---+----------+---------------+---------------+  !POSTINFSN       !15:36       !93 !95/62 (73)!O2 sat 100% on !I still have a !  !RECOVERY6; 1 min!            !   !          !room air.      !little cramp.  !  !38 sec          !            !   !          !               !               !  +----------------+------------+---+----------+---------------+---------------+  !Baseline        !------------!---!----------!No symptoms.   !---------------!  +----------------+------------+---+----------+---------------+---------------+  Stress results:   Maximal heart rate during stress was 105bpm (76% of maximal  predicted heart rate). The maximal predicted heart rate was 138bpm. The target  heart rate was 117bpm. The target heart rate was not achieved. The  rate-pressure product for the peak heart rate and blood pressure was 68849uq  Hg/min.  ECG:   Atrial fibrillation with right bundle branch block.  Stress ECG:   The stress ECG is negative for ischemia.    ------------------------------------------------------------------------------  Myocardial perfusion imaging:   Left ventricular size is normal. There is no  transient ischemic dilation of the left ventricle during stress. The TID ratio  is 0.95. Prone imaging was not utilized. There is a medium-sized,  mild-moderate, fixed defect involving the basal and mid inferolateral wall(s),  consistent with infarction.  Gated SPECT:  There is dyskinesis involving the  ventricular septum, possibly  related to underlying conduction abnormality.  Results table:    +-----------------+-------+  !Stage description!Stress:!  +-----------------+-------+  !EF (%)           !45%    !  +-----------------+-------+  Prepared and electronically signed by:    Micky Fuentes MD  11/30/2024 11:39      Cultures  Microbiology Results       None             Assessment/Plan:  John Wagoner is an 80-year-old male with a past medical history of ESRD on HD (MWF), severe PAD s/p bilateral AKA, left renal cell carcinoma s/p left nephrectomy (2017) with metastasis to the lungs, atrial fibrillation, HFrEF, anemia of chronic disease, thrombocytopenia, and hyperlipidemia who presented to the ED for acute onset right eye vision loss. Patient remains admitted for evaluation of bilateral carotid artery stenosis and will undergo a right transcarotid artery revascularization (TCAR) with Vascular Surgery on 12/3/24. Neurology, Heme-Onc, Cardiology and Vascular surgery are on consult.    Acute Onset Right Eye Vision Loss  Bilateral Arm Weakness  Bilateral Carotid Artery Stenosis  Likely 2/2 right ophthalmic infarct from MELISSA stenosis vs Afib.   Of note, patient has atrial fibrillation and stopped taking his anticoagulation 2-3 weeks ago. Change in vision could be related. CT head & neck showed 90% stenosis of internal carotid artery. Low suspicion for GCA as patient has no headache, nausea/vomiting.   Vision change is described as painless, sudden vision loss without floaters  Per optometrist, patient noted to have papilledema and nonreactive right pupil dilation  CT head: no acute intracranial hemorrhage, chronic lacunar infractions  CT head and neck: Stenosis of right internal carotid artery 90%, stenosis of left internal carotid artery 70%.    Physical exam: normal pupil reaction to light bilaterally, loss of peripheral vision of the right eye.   Cholesterol 84, TG 58, HDL 47, and LDL 25 on7/2023 and A1c 5.3 on  7/2023  NIHSS of 8 on admission  MRI Brain W/Wo contrast (11/28/24): Suspect punctate acute/subacute infarct in the left cerebellar hemisphere. Chronic infarcts in the bilateral cerebellar hemispheres and stephan. Moderate generalized cerebral volume loss and sequelae of chronic small vessel ischemic disease.  TTE W bubble: EF 45%. Hypokinesis of midinferoseptal wall. Mild-moderate aortic stenosis. Left atrium severely dilated. Mod mitral regurg.   Lexiscan 11/30: EF 45%. No ischemia. Positive infarction. Medium-sized, mild-moderate fixed defect in basal and mid inferolateral wall.   Plan:  -Neurology recommendations:    -Likely right ophthalmic infarct  -MRI brain with acute punctate infarct in L cerebellum suggesting embolic etiology.   -Avoid hypotension and rapid fluid shifts (During dialysis) given significant bilateral carotid disease.   -PT/OT recs: No further therapy needed.  -SLP recs: Pt has decreased cognitive function but is at baseline per patient, no further therapy is needed.   -Vascular recommendations:  -Tentatively plan for TCAR on Tuesday 12/3  -Added loading dose  mg po x 1 and Plavix 450 mg po x 1 today and start maintenance dose tomorrow AM    -Ok to continue aspirin & plavix. Hold xarelto.  -Cardiology consulted for cardiac clearance prior to surgery, will f/u recs.     -Cleared to proceed from cardiac standpoint  -Ophthalmology consulted, per Dr. 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 Recommend cardiovascular workup.    -Visual prognosis poor in right eye, guarded in left eye.   -Outpatient follow-up at the [facility] on discharge.    ESRD (end stage renal disease) on dialysis (CMD)  Hypophosphatemia 2/2 hyperparathyroidism of renal origin  History of ESRD for multiple years. On hemodialysis Monday, Wednesday, and Fridays.   Tunneled right IJ HD Permcath (4/1/24); Currently being used for hemodialysis   Patient follows with nephrologist,   outpatient   S/p 11/26 HD session w/ 600ml removed  S/p 11/29 HD session with net 700ml UF  S/p 12/02 HD session w/ net 500ml UF  Phos 1.8 (12/2/24)  S/p Neutraphos 1 packet po TID x 3 doses  Plan:  -Nephrology recs:   -MWF HD  -Sevelamer held on 12/2/24  -Monitor CMP and electrolytes  -Continue cincalcet 60 mg daily and sevelamer carbonate 800 mg BID. Transitioned to packet given patient unable to swallow pill. (Pt has been declining to take his sevelamer)  -Monitor BP post HD as patient has been having recent episodes of hypotension post HD    Renal cell carcinoma of left kidney metastatic to other site (CMD)  Decreased appetite due to chemotherapy  S/p left nephrectomy in 2017.  Currently has metastasis to lungs   Current therapy is with PO cabozantinib 40 mg 5 days on/2 days off  Follows with Dr. Serrato outpatient   Plan:   - Heme/Onc recs:   -Okay to continue Xarelto once platelets above 50K (holding per vascular)   -hold cabozantinib while inpatient    -Transfuse platelets up to 100k for surgery (Attempt)  - Patient to bring his own supply as hospital does not carry the medication  - Continue mirtazapine 7.5 mg nightly for appetite stimulation      Persistent atrial fibrillation  (CMD)  Currently in atrial fibrillation not on rate or rhythm control medication.  Patient used to be on carvedilol 6.25 mg twice daily however, does not always take it due to hypotensive episodes  CHADSVASc Stroke Risk Score = 7  Home medication include Xarelto 15 mg daily.  Patient has not been taking the medication for the past 2-3 weeks per wife but pt stated he was taking it.  Denies any chest pain, heart palpitations, shortness of breath, numbness/tingling  Plan:  -Holding Xarelto 15 mg daily  -Holding Carvedilol 6.25 mg BID, in setting of hypotension.   -Continue to monitor telemetry    Peripheral artery disease (CMD)  Bilateral amputation above knee  (CMD)  S/P L AKA (6/16/22)   S/P R AKA  (3/9/23)  S/p bilateral  above the knee amputation  Home meds: atorvastatin 80mg qD and aspirin 81mg qdaily  No concern for worsening PAD at this time  Plan:  -Continue home atorvastatin 80mg qDaily and ASA 81mg qDaily      HFrEF (heart failure with reduced ejection fraction)  (CMD)  TTE showed LVEF 30-35% on 10/25/2024, severely reduced LV systolic function, dyskinetic ventricular septal motion, normal right ventricular size and reduced systolic function  Home GDMT: carvedilol 6.25 mg BID and lisinopril 10mg daily   Not fluid overloaded on exam, no concern for CHF exacerbation   Plan:  -Holding home BP meds due to concern for low BP's during HD sessions.  -Holding Carvedilol 6.25 mg BID and Lisinopril 10 mg daily in setting of low blood pressures.     Chemotherapy-induced nausea  At home patient takes prochlorperazine 10 mg q6h and ondansetron 8 mg q6h PRN for nausea.  However, patient's QTc is prolonged at 562.  Pt currently denied any nausea  Plan:  -Consider Tigan if patient gets nauseous    Anemia of chronic disease  1/2024: Iron panel on showed iron 33, TIBC 165, iron percent saturation 20, and ferritin 1,069  Vitamin B12 392, folate 7.7  .2; Hgb 9.7 (11/28)  No active signs of bleeding  Plan:  -Continue to monitor hemoglobin daily, transfuse if hgb < 7    Thrombocytopenia (CMD) Stable  Likely reactive   Platelets 80 (11/24) > 65 > 83 > 77 > 81 > 142 > 104  No signs of bleeding on exam  Plan:   -Continue to monitor daily  -Heme/Onc recs: Okay to continue Xarelto once platelets above 50K    F: SLIV  E: replete as needed  N: Renal Diet  DVT Ppx: Xarelto 15 mg daily (holding) and ASA 81 mg  GI Ppx: NA    Dispo: Pending TCAR on today 12/3 > patient now in [facility], will follow peripherally.     Code Status    Code Status: Full Resuscitation    Plan of care was discussed with attending physician, Dr. Cedeno and senior resident Dr. Serrano.     Brandt Fox DO  PGY-1  Family Medicine        Senior Addendum:  John  White is an 80-year-old male with a past medical history of ESRD on HD (MWF), severe PAD s/p bilateral AKA, left renal cell carcinoma s/p left nephrectomy (2017) with metastasis to the lungs, atrial fibrillation, HFrEF, anemia of chronic disease, thrombocytopenia, and hyperlipidemia who presented to the ED for acute onset right eye vision loss. Patient remains admitted for evaluation of symptomatic right internal carotid artery high-grade stenosis and is s/p right transcarotid artery revascularization (TCAR) with Vascular Surgery today. Patient remains admitted in the [facility] following surgery due to hypotension and new pressor requirements. Family Medicine inpatient team will continue to follow peripherally until patient is transferred to the general medical floors. TCAR today showed high-grade carotid bulb and proximal ICA stenosis. Home Xarelto 15 mg daily will continue to be held for 48h post-op. Patient will need q1h neuro checks. Home Coreg 6.25 mg BID and Lisinopril 10 mg daily remains held and will resume when appropriate. Discharge home pending improvement in clinical status, transfer from [facility] to the medical floors and clearance from specialists.    I discussed this case and plan with the intern. I also reviewed the assessment and plan. Patient's chart, labs, imaging, and medications reviewed. I agree with the intern's note. Should additional changes to the assessment and plan occur after signature of the note they will be in an addendum.      Edith Ruby MD  PGY-3  Family Medicine  PLEASE *Contact \"[facility] Residents\" via [employer] rather than the author of this note*     yes...

## 2025-03-21 NOTE — ED ADULT NURSE NOTE - NS ED NURSE REPORT GIVEN TO FT
"Attempted to schedule 15 yr well; received message \"the number you have dialed is no longer in service\"  " MELISSA Mayorga